# Patient Record
Sex: FEMALE | Race: WHITE | NOT HISPANIC OR LATINO | Employment: FULL TIME | URBAN - NONMETROPOLITAN AREA
[De-identification: names, ages, dates, MRNs, and addresses within clinical notes are randomized per-mention and may not be internally consistent; named-entity substitution may affect disease eponyms.]

---

## 2017-04-25 ENCOUNTER — CONSULT (OUTPATIENT)
Dept: CARDIOLOGY | Facility: CLINIC | Age: 47
End: 2017-04-25

## 2017-04-25 VITALS
DIASTOLIC BLOOD PRESSURE: 80 MMHG | SYSTOLIC BLOOD PRESSURE: 120 MMHG | BODY MASS INDEX: 25.31 KG/M2 | WEIGHT: 167 LBS | HEART RATE: 57 BPM | HEIGHT: 68 IN

## 2017-04-25 DIAGNOSIS — R00.2 PALPITATIONS: Primary | ICD-10-CM

## 2017-04-25 DIAGNOSIS — I10 ESSENTIAL HYPERTENSION: ICD-10-CM

## 2017-04-25 PROCEDURE — 93000 ELECTROCARDIOGRAM COMPLETE: CPT | Performed by: INTERNAL MEDICINE

## 2017-04-25 PROCEDURE — 99204 OFFICE O/P NEW MOD 45 MIN: CPT | Performed by: INTERNAL MEDICINE

## 2017-04-25 RX ORDER — BISOPROLOL FUMARATE 5 MG/1
5 TABLET, FILM COATED ORAL DAILY
COMMUNITY
End: 2017-04-25 | Stop reason: SDUPTHER

## 2017-04-25 RX ORDER — BISOPROLOL FUMARATE 5 MG/1
5 TABLET, FILM COATED ORAL DAILY
Qty: 90 TABLET | Refills: 3 | Status: SHIPPED | OUTPATIENT
Start: 2017-04-25 | End: 2017-09-26 | Stop reason: SDUPTHER

## 2017-04-25 RX ORDER — HYDRALAZINE HYDROCHLORIDE 25 MG/1
TABLET, FILM COATED ORAL
Qty: 60 TABLET | Refills: 3 | Status: SHIPPED | OUTPATIENT
Start: 2017-04-25 | End: 2017-09-26

## 2017-04-25 NOTE — PROGRESS NOTES
Encounter Date:04/25/2017    Location: [facility]    Patient ID: Ximena Sanchez is a 46 y.o.,  female    1970  Subjective:      Chief Complaint/Reason for visit:    Chief Complaint   Patient presents with   • Rapid Heart Rate       Problem List:  1.  Palpitations   A.  Stress test in [place] approx 7 years ago.  Irregular rhythm at 145 bpm; good recovery.    B.  Holter monitor in [place]:  Multifocal palpitations; data deficit  2.  Cardiac murmur   A.  Echocardiogram 2014: Dr Cartagena.  EF 56%, no significant valvular abnormalities.    3.  Hypertension   A.  Reaches stroke level with any source of infection  4.  Osteoarthritis in foot and ankle  5.  Uterine fibroids  6.  Surgeries   A.  Bilateral lumpectomy- benign    HPI:  Patient is a pleasant 46 year old female with the above noted medical history who presents today in consultation for palpitations.  She states that she was diagnosed with these many years ago while living in [place] and states that both her mother and grandmother have had failed ablations of premature ventricular contractions PVC's).  She had previously been on Propranolol with near syncope due to hypotension and dizziness.  She was then switched to Zebeta and feels well on this one.  She states that she typically notices her palpitations about once per month on average; however, she is starting to notice a slight increase of them occurring a couple of times each week.  She can have associated shortness of breath if they last up to 1 minute consistently.  Fortunately,  most of her episodes tend to be shorter in duration.  She admits that they are worse when she is overly tired, and around her menstrual cycle.  She has not been able to tolerate hormone therapy for her uterine fibroids because it causes her palpitations to become significantly worse.  She does limit her caffeine intake to 3-4 per week and does not note an increase in symptoms.  [place]  "and sometimes feels short of breath, but does not have palpitations.  She also states that with any source of infection, her blood pressure will elevate significantly with an associated headache.  She states it has been as high as 200/115 and was told by a clinician that she was \"acting and talking weird\" at that time.  For this reason, we will initiate a prn medication with parameters for her to take.      Cardiac ROS:  Positive for shortness of breath at times, fatigue, edema.  Negative for Chest pain, dyspnea on exertion, orthopnea, PND, fatigue, dizziness, pre-syncope/ syncope, snoring/ PAMELA    Cardiac Risk Factors: hypertension  Social History     Social History   • Marital status:      Spouse name: N/A   • Number of children: N/A   • Years of education: N/A     Occupational History   • Not on file.     Social History Main Topics   • Smoking status: Never Smoker   • Smokeless tobacco: Never Used   • Alcohol use Yes      Comment: socially- 1 drink per month   • Drug use: No   • Sexual activity: Defer     Other Topics Concern   • Not on file     Social History Narrative       family history includes Arrhythmia in her maternal grandmother and mother; Atrial fibrillation in her maternal grandmother and mother; No Known Problems in her father. There is no history of Ovarian cancer or Breast cancer.     has a past medical history of Essential hypertension (4/25/2017).    Allergies   Allergen Reactions   • Acetaminophen      Flu like symptoms     • Amoxicillin Hives   • Codeine GI Intolerance   • Prednisone      Low doses tolerated         Current Outpatient Prescriptions:   •  bisoprolol (ZEBeta) 5 MG tablet, Take 5 mg by mouth Daily., Disp: , Rfl:   •  hydrALAZINE (APRESOLINE) 25 MG tablet, Take 1 tablet by mouth BID as needed for a systolic blood pressure >150, Disp: 60 tablet, Rfl: 3    Review of Systems   Constitution: Negative.   HENT: Positive for headaches (when she is hypertensive).    Eyes: Negative.  "   Cardiovascular: Positive for irregular heartbeat and palpitations. Negative for chest pain, dyspnea on exertion, leg swelling, near-syncope, orthopnea, paroxysmal nocturnal dyspnea and syncope.   Respiratory: Positive for shortness of breath (with walking horse up and down hills).    Endocrine: Negative.    Hematologic/Lymphatic: Negative.    Skin: Negative.         Spider veins, varicosities BLE   Musculoskeletal: Negative.    Gastrointestinal: Negative.    Genitourinary: Negative.    Psychiatric/Behavioral: Negative.    Allergic/Immunologic: Negative.        Vitals:    04/25/17 0955   BP: 120/80   Pulse: 57          Objective:       Physical Exam   Constitutional: She is oriented to person, place, and time. She appears well-developed and well-nourished.   HENT:   Head: Normocephalic and atraumatic.   Eyes: Pupils are equal, round, and reactive to light.   Neck: Normal range of motion. Neck supple. No JVD present.   Cardiovascular: Normal rate, regular rhythm and intact distal pulses.  Exam reveals no gallop and no friction rub.    Murmur (1/6 CHADD) heard.  Pulmonary/Chest: Effort normal and breath sounds normal. She has no wheezes.   Abdominal: Soft. Bowel sounds are normal. There is no tenderness.   Musculoskeletal: Normal range of motion. She exhibits edema (trace on right LE).   Neurological: She is alert and oriented to person, place, and time.   Skin: Skin is warm and dry.   Psychiatric: She has a normal mood and affect. Her behavior is normal.       Data Review:     ECG 12 Lead  Date/Time: 4/25/2017 10:24 AM  Performed by: CONSTANTINO AYOUB  Authorized by: CONSTANTINO AYOUB   Rhythm: sinus bradycardia  Rate: bradycardic  BPM: 57  Comments: Sinus stefan with sinus arrythmia          Assessment:      ICD-10-CM ICD-9-CM   1. Palpitations R00.2 785.1   2. Essential hypertension I10 401.9       Plan:  1.  Start hydralazine 25mg BID prn for systolic >150 during times of infection  2.  Continue Zebeta as directed.   3.   Follow up in 3-4 months or sooner if needed.           Scribed for Tasneem Lawson MD by LUIS ANGEL Vega. 4/25/2017  11:02 AM   I Tasneem Lawson MD personally performed the services described in this documentation as scribed by the above individual in my presence, and it is both accurate and complete.    Tasneem Lawson MD, FACC

## 2017-09-26 ENCOUNTER — OFFICE VISIT (OUTPATIENT)
Dept: CARDIOLOGY | Facility: CLINIC | Age: 47
End: 2017-09-26

## 2017-09-26 VITALS
SYSTOLIC BLOOD PRESSURE: 151 MMHG | WEIGHT: 157 LBS | HEIGHT: 68 IN | BODY MASS INDEX: 23.79 KG/M2 | HEART RATE: 80 BPM | DIASTOLIC BLOOD PRESSURE: 87 MMHG

## 2017-09-26 DIAGNOSIS — R00.2 PALPITATIONS: Primary | ICD-10-CM

## 2017-09-26 DIAGNOSIS — I10 ESSENTIAL HYPERTENSION: ICD-10-CM

## 2017-09-26 PROBLEM — J20.9 ACUTE BRONCHITIS: Status: ACTIVE | Noted: 2017-09-26

## 2017-09-26 PROCEDURE — 99213 OFFICE O/P EST LOW 20 MIN: CPT | Performed by: INTERNAL MEDICINE

## 2017-09-26 RX ORDER — PREDNISONE 20 MG/1
TABLET ORAL DAILY
Refills: 0 | COMMUNITY
Start: 2017-09-21 | End: 2018-11-27

## 2017-09-26 RX ORDER — BENZONATATE 100 MG/1
CAPSULE ORAL 3 TIMES DAILY
Refills: 0 | COMMUNITY
Start: 2017-09-21 | End: 2018-11-27

## 2017-09-26 RX ORDER — CLONIDINE HYDROCHLORIDE 0.1 MG/1
0.1 TABLET ORAL EVERY 12 HOURS PRN
Qty: 30 TABLET | Refills: 3 | Status: SHIPPED | OUTPATIENT
Start: 2017-09-26

## 2017-09-26 RX ORDER — AZITHROMYCIN 250 MG/1
TABLET, FILM COATED ORAL
Qty: 6 TABLET | Refills: 0 | Status: SHIPPED | OUTPATIENT
Start: 2017-09-26 | End: 2018-11-27

## 2017-09-26 RX ORDER — BISOPROLOL FUMARATE 5 MG/1
5 TABLET, FILM COATED ORAL DAILY
Qty: 90 TABLET | Refills: 3 | Status: SHIPPED | OUTPATIENT
Start: 2017-09-26 | End: 2018-10-06 | Stop reason: SDUPTHER

## 2017-09-26 RX ORDER — CETIRIZINE HYDROCHLORIDE 10 MG/1
10 TABLET ORAL AS NEEDED
Refills: 0 | COMMUNITY
Start: 2017-09-18 | End: 2020-11-24

## 2017-09-26 NOTE — PROGRESS NOTES
[facility]  Office visit  Ximena Sanchez  1970  [phone]    VISIT DATE:  09/26/2017    PCP: Nataliya Barrera MD  [address] [place] [address]    CC:  Chief Complaint   Patient presents with   • palps   • Heart Murmur   • Shortness of Breath       PROBLEM LIST:  1.  Palpitations                        A.  Stress test in [place] approx 7 years ago.  Irregular rhythm at 145 bpm; good recovery.                         B.  Holter monitor in [place]:  Multifocal palpitations; data deficit  2.  Cardiac murmur                        A.  Echocardiogram 2014: Dr Cartagena.  EF 56%, no significant valvular abnormalities.    3.  Hypertension                        A.  Reaches stroke level with any source of infection  4.  Osteoarthritis in foot and ankle  5.  Uterine fibroids  6.  Surgeries                        A.  Bilateral lumpectomy- benign    ASSESSMENT:   Diagnosis Plan   1. Palpitations     2. Essential hypertension         PLAN:  -Continue bisoprolol  -Continue as needed albuterol inhaler and Tessalon Perles for acute bronchitis.  Has completed a course of steroid burst.  Starting Z-Mike, instructed to follow-up with primary care physician if symptoms persist    Subjective  Palpitations of remained relatively stable.  She has intermittent flares.  Blood pressures usually remain less than 140/90 mmHg unless she has an illness in which she can have spikes in her blood pressure greater than 170/100.  She developed headache and flushing palpitations when she used when necessary hydralazine.  Currently has had productive cough for about 2 weeks.  Has been on albuterol inhalers which helped for about an hour.  The only relief she can really get his with Robitussin but this makes her drowsy.  Tessalon Perles have not been very effective.  She underwent a steroid burst last week which was prescribed by urgent care clinic.    PHYSICAL EXAMINATION:  Vitals:    09/26/17 1546   BP: 151/87   BP Location:  "Right arm   Patient Position: Sitting   Pulse: 80   Weight: 157 lb (71.2 kg)   Height: 68\" (172.7 cm)     General Appearance:    Alert, cooperative, no distress, appears stated age   Head:    Normocephalic, without obvious abnormality, atraumatic   Eyes:    conjunctiva/corneas clear   Nose:   Nares normal, septum midline, mucosa normal, no drainage   Throat:   Lips, teeth and gums normal   Neck:   Supple, symmetrical, trachea midline, no carotid    bruit or JVD   Lungs:     Clear to auscultation bilaterally, respirations unlabored   Chest Wall:    No tenderness or deformity    Heart:    Regular rate and rhythm, S1 and S2 normal, no murmur, rub   or gallop, normal carotid impulse bilaterally without bruit.   Abdomen:     Soft, non-tender   Extremities:   Extremities normal, atraumatic, no cyanosis or edema   Pulses:   2+ and symmetric all extremities   Skin:   Skin color, texture, turgor normal, no rashes or lesions       Diagnostic Data:  Procedures  No results found for: CHLPL, TRIG, HDL, LDLDIRECT  Lab Results   Component Value Date    GLUCOSE 88 08/01/2015    BUN 11 08/01/2015    CREATININE 0.8 08/01/2015     08/01/2015    K 3.5 08/01/2015     08/01/2015    CO2 21 08/01/2015     No results found for: HGBA1C  Lab Results   Component Value Date    WBC 3.78 08/01/2015    HGB 14.2 08/01/2015    HCT 41.5 08/01/2015     08/01/2015       Allergies  Allergies   Allergen Reactions   • Acetaminophen      Flu like symptoms     • Amoxicillin Hives   • Codeine GI Intolerance   • Prednisone      Low doses tolerated       Current Medications    Current Outpatient Prescriptions:   •  benzonatate (TESSALON) 100 MG capsule, 3 (Three) Times a Day., Disp: , Rfl: 0  •  bisoprolol (ZEBeta) 5 MG tablet, Take 1 tablet by mouth Daily., Disp: 90 tablet, Rfl: 3  •  cetirizine (zyrTEC) 10 MG tablet, Daily., Disp: , Rfl: 0  •  predniSONE (DELTASONE) 20 MG tablet, Daily., Disp: , Rfl: 0  •  PROAIR  (90 Base) MCG/ACT " inhaler, inhale 2 puffs by mouth every 4 hours if needed, Disp: , Rfl: 0  •  azithromycin (ZITHROMAX Z-WILBER) 250 MG tablet, Take 2 tablets the first day, then 1 tablet daily for 4 days., Disp: 6 tablet, Rfl: 0  •  CloNIDine (CATAPRES) 0.1 MG tablet, Take 1 tablet by mouth Every 12 (Twelve) Hours As Needed for High Blood Pressure (>170/100)., Disp: 30 tablet, Rfl: 3          ROS  Review of Systems   Cardiovascular: Positive for palpitations. Negative for chest pain and dyspnea on exertion.   Respiratory: Positive for cough and wheezing. Negative for shortness of breath.          SOCIAL HX  Social History     Social History   • Marital status:      Spouse name: N/A   • Number of children: N/A   • Years of education: N/A     Occupational History   • Not on file.     Social History Main Topics   • Smoking status: Never Smoker   • Smokeless tobacco: Never Used   • Alcohol use Yes      Comment: socially- 1 drink per month   • Drug use: No   • Sexual activity: Defer     Other Topics Concern   • Not on file     Social History Narrative       FAMILY HX  Family History   Problem Relation Age of Onset   • Arrhythmia Mother    • Atrial fibrillation Mother    • No Known Problems Father    • Arrhythmia Maternal Grandmother    • Atrial fibrillation Maternal Grandmother    • Ovarian cancer Neg Hx    • Breast cancer Neg Hx              Andre Smyth III, MD, [facility]

## 2017-12-04 ENCOUNTER — TELEPHONE (OUTPATIENT)
Dept: CARDIOLOGY | Facility: CLINIC | Age: 47
End: 2017-12-04

## 2017-12-04 NOTE — TELEPHONE ENCOUNTER
Patient called and stated having gynecological surgery by  and needs cardiac clearance. Please advise.

## 2017-12-05 NOTE — TELEPHONE ENCOUNTER
Called patient and told her cleared for surgery. Faxed Cardiac clearance letter to  at [facility]. # [phone] per patients request.

## 2017-12-26 ENCOUNTER — APPOINTMENT (OUTPATIENT)
Dept: PREADMISSION TESTING | Facility: HOSPITAL | Age: 47
End: 2017-12-26

## 2017-12-26 ENCOUNTER — PREP FOR SURGERY (OUTPATIENT)
Dept: OTHER | Facility: HOSPITAL | Age: 47
End: 2017-12-26

## 2017-12-26 DIAGNOSIS — N93.9 ABNORMAL UTERINE BLEEDING (AUB): ICD-10-CM

## 2017-12-26 DIAGNOSIS — N93.9 ABNORMAL UTERINE BLEEDING (AUB): Primary | ICD-10-CM

## 2017-12-26 LAB
ALBUMIN SERPL-MCNC: 4.2 G/DL (ref 3.2–4.8)
ALBUMIN/GLOB SERPL: 1.8 G/DL (ref 1.5–2.5)
ALP SERPL-CCNC: 70 U/L (ref 25–100)
ALT SERPL W P-5'-P-CCNC: 17 U/L (ref 7–40)
ANION GAP SERPL CALCULATED.3IONS-SCNC: 18 MMOL/L (ref 3–11)
AST SERPL-CCNC: 21 U/L (ref 0–33)
BASOPHILS # BLD AUTO: 0.02 10*3/MM3 (ref 0–0.2)
BASOPHILS NFR BLD AUTO: 0.4 % (ref 0–1)
BILIRUB SERPL-MCNC: 0.5 MG/DL (ref 0.3–1.2)
BUN BLD-MCNC: 14 MG/DL (ref 9–23)
BUN/CREAT SERPL: 23.3 (ref 7–25)
CALCIUM SPEC-SCNC: 9.4 MG/DL (ref 8.7–10.4)
CHLORIDE SERPL-SCNC: 105 MMOL/L (ref 99–109)
CO2 SERPL-SCNC: 26 MMOL/L (ref 20–31)
CREAT BLD-MCNC: 0.6 MG/DL (ref 0.6–1.3)
DEPRECATED RDW RBC AUTO: 41.1 FL (ref 37–54)
EOSINOPHIL # BLD AUTO: 0.09 10*3/MM3 (ref 0–0.3)
EOSINOPHIL NFR BLD AUTO: 1.7 % (ref 0–3)
ERYTHROCYTE [DISTWIDTH] IN BLOOD BY AUTOMATED COUNT: 12.9 % (ref 11.3–14.5)
GFR SERPL CREATININE-BSD FRML MDRD: 106 ML/MIN/1.73
GLOBULIN UR ELPH-MCNC: 2.4 GM/DL
GLUCOSE BLD-MCNC: 89 MG/DL (ref 70–100)
HCG INTACT+B SERPL-ACNC: <5 MIU/ML
HCT VFR BLD AUTO: 40.4 % (ref 34.5–44)
HGB BLD-MCNC: 13.2 G/DL (ref 11.5–15.5)
IMM GRANULOCYTES # BLD: 0.02 10*3/MM3 (ref 0–0.03)
IMM GRANULOCYTES NFR BLD: 0.4 % (ref 0–0.6)
LYMPHOCYTES # BLD AUTO: 1.69 10*3/MM3 (ref 0.6–4.8)
LYMPHOCYTES NFR BLD AUTO: 32.3 % (ref 24–44)
MCH RBC QN AUTO: 28.4 PG (ref 27–31)
MCHC RBC AUTO-ENTMCNC: 32.7 G/DL (ref 32–36)
MCV RBC AUTO: 87.1 FL (ref 80–99)
MONOCYTES # BLD AUTO: 0.42 10*3/MM3 (ref 0–1)
MONOCYTES NFR BLD AUTO: 8 % (ref 0–12)
NEUTROPHILS # BLD AUTO: 2.99 10*3/MM3 (ref 1.5–8.3)
NEUTROPHILS NFR BLD AUTO: 57.2 % (ref 41–71)
PLATELET # BLD AUTO: 249 10*3/MM3 (ref 150–450)
PMV BLD AUTO: 10.4 FL (ref 6–12)
POTASSIUM BLD-SCNC: 4 MMOL/L (ref 3.5–5.5)
PROT SERPL-MCNC: 6.6 G/DL (ref 5.7–8.2)
RBC # BLD AUTO: 4.64 10*6/MM3 (ref 3.89–5.14)
SODIUM BLD-SCNC: 149 MMOL/L (ref 132–146)
WBC NRBC COR # BLD: 5.23 10*3/MM3 (ref 3.5–10.8)

## 2017-12-26 PROCEDURE — 93005 ELECTROCARDIOGRAM TRACING: CPT

## 2017-12-26 PROCEDURE — 84702 CHORIONIC GONADOTROPIN TEST: CPT | Performed by: OBSTETRICS & GYNECOLOGY

## 2017-12-26 PROCEDURE — 85025 COMPLETE CBC W/AUTO DIFF WBC: CPT | Performed by: OBSTETRICS & GYNECOLOGY

## 2017-12-26 PROCEDURE — 93010 ELECTROCARDIOGRAM REPORT: CPT | Performed by: INTERNAL MEDICINE

## 2017-12-26 PROCEDURE — 80053 COMPREHEN METABOLIC PANEL: CPT | Performed by: OBSTETRICS & GYNECOLOGY

## 2017-12-26 PROCEDURE — 36415 COLL VENOUS BLD VENIPUNCTURE: CPT

## 2017-12-26 RX ORDER — SODIUM CHLORIDE, SODIUM LACTATE, POTASSIUM CHLORIDE, CALCIUM CHLORIDE 600; 310; 30; 20 MG/100ML; MG/100ML; MG/100ML; MG/100ML
100 INJECTION, SOLUTION INTRAVENOUS CONTINUOUS
Status: CANCELLED | OUTPATIENT
Start: 2017-12-26

## 2017-12-26 RX ORDER — SODIUM CHLORIDE 0.9 % (FLUSH) 0.9 %
1-10 SYRINGE (ML) INJECTION AS NEEDED
Status: CANCELLED | OUTPATIENT
Start: 2017-12-26

## 2017-12-27 ENCOUNTER — LAB REQUISITION (OUTPATIENT)
Dept: LAB | Facility: HOSPITAL | Age: 47
End: 2017-12-27

## 2017-12-27 DIAGNOSIS — N92.0 EXCESSIVE AND FREQUENT MENSTRUATION WITH REGULAR CYCLE: ICD-10-CM

## 2017-12-27 PROCEDURE — 88305 TISSUE EXAM BY PATHOLOGIST: CPT | Performed by: OBSTETRICS & GYNECOLOGY

## 2017-12-28 LAB
CYTO UR: NORMAL
LAB AP CASE REPORT: NORMAL
LAB AP CLINICAL INFORMATION: NORMAL
Lab: NORMAL
PATH REPORT.FINAL DX SPEC: NORMAL
PATH REPORT.GROSS SPEC: NORMAL

## 2018-09-25 ENCOUNTER — TRANSCRIBE ORDERS (OUTPATIENT)
Dept: ADMINISTRATIVE | Facility: HOSPITAL | Age: 48
End: 2018-09-25

## 2018-09-25 ENCOUNTER — HOSPITAL ENCOUNTER (OUTPATIENT)
Dept: GENERAL RADIOLOGY | Facility: HOSPITAL | Age: 48
Discharge: HOME | End: 2018-09-25
Admitting: NURSE PRACTITIONER

## 2018-09-25 DIAGNOSIS — M79.671 RIGHT FOOT PAIN: ICD-10-CM

## 2018-09-25 DIAGNOSIS — M79.671 RIGHT FOOT PAIN: Primary | ICD-10-CM

## 2018-09-25 PROCEDURE — 73630 X-RAY EXAM OF FOOT: CPT

## 2018-10-08 RX ORDER — BISOPROLOL FUMARATE 5 MG/1
TABLET, FILM COATED ORAL
Qty: 90 TABLET | Refills: 0 | Status: SHIPPED | OUTPATIENT
Start: 2018-10-08 | End: 2018-11-27 | Stop reason: SDUPTHER

## 2018-11-27 ENCOUNTER — OFFICE VISIT (OUTPATIENT)
Dept: CARDIOLOGY | Facility: CLINIC | Age: 48
End: 2018-11-27

## 2018-11-27 VITALS
BODY MASS INDEX: 24.86 KG/M2 | SYSTOLIC BLOOD PRESSURE: 110 MMHG | WEIGHT: 164 LBS | DIASTOLIC BLOOD PRESSURE: 72 MMHG | HEIGHT: 68 IN | HEART RATE: 76 BPM

## 2018-11-27 DIAGNOSIS — R00.2 PALPITATIONS: Primary | ICD-10-CM

## 2018-11-27 PROBLEM — I10 ESSENTIAL HYPERTENSION: Status: RESOLVED | Noted: 2017-04-25 | Resolved: 2018-11-27

## 2018-11-27 PROCEDURE — 99213 OFFICE O/P EST LOW 20 MIN: CPT | Performed by: INTERNAL MEDICINE

## 2018-11-27 RX ORDER — BISOPROLOL FUMARATE 5 MG/1
5 TABLET, FILM COATED ORAL DAILY
Qty: 90 TABLET | Refills: 4 | Status: SHIPPED | OUTPATIENT
Start: 2018-11-27 | End: 2019-12-10 | Stop reason: SDUPTHER

## 2018-11-27 NOTE — PROGRESS NOTES
[facility] at [facility]  Office visit  Ximena Sanchez  1970  [phone]    VISIT DATE:  09/26/2017    PCP: Awilda Frances MD  [address] [address]    CC:  No chief complaint on file.      PROBLEM LIST:  1.  Palpitations                        A.  Stress test in [place] approx 7 years ago.  Irregular rhythm at 145 bpm; good recovery.                         B.  Holter monitor in [place]:  Multifocal palpitations; data deficit  2.  Cardiac murmur                        A.  Echocardiogram 2014: EF 56%, no significant valvular abnormalities.    3.  Hypertension  4.  Osteoarthritis in foot and ankle  5.  Uterine fibroids  6.  Surgeries                        A.  Bilateral lumpectomy- benign    ASSESSMENT:   Diagnosis Plan   1. Palpitations         PLAN:  -Continue bisoprolol  -Encouraged regular exercise  -Avoiding dehydration and liberalizing salt intake during flares of orthostasis  -Ambulatory ECG monitor(Zio) after the first of the year if symptoms of palpitations persist or worsen.    Subjective  Developed lightheadedness after being seated for prolonged periods of time towards the end of the summer.  Predominantly noticed this when she was trying to get out of her car.  No near syncope or syncope.  There is no obvious trigger to her orthostasis.  She did not change the way her medications were taken and she had no change in her baseline functional capacity or weight.  Symptoms gradually resolved.  Over the previous few weeks she's had an increase in her palpitations which she describes as a brief fluttering sensation.  They're not associated with presyncope, chest pain or shortness of breath.  No obvious triggers.  Occurring 2-3 times per week.  Also has intermittent flip-flop sensations in her chest when she is trying to go to sleep at night.  Still taking bisoprolol bedtime..  Discussed potential ambulatory ECG monitor, she is concerned that her insurance will not cover  this, she currently has no high risk clinical features.    PHYSICAL EXAMINATION:  There were no vitals filed for this visit.  General Appearance:    Alert, cooperative, no distress, appears stated age   Head:    Normocephalic, without obvious abnormality, atraumatic   Eyes:    conjunctiva/corneas clear   Nose:   Nares normal, septum midline, mucosa normal, no drainage   Throat:   Lips, teeth and gums normal   Neck:   Supple, symmetrical, trachea midline, no carotid    bruit or JVD   Lungs:     Clear to auscultation bilaterally, respirations unlabored   Chest Wall:    No tenderness or deformity    Heart:    Regular rate and rhythm, S1 and S2 normal, no murmur, rub   or gallop, normal carotid impulse bilaterally without bruit.   Abdomen:     Soft, non-tender   Extremities:   Extremities normal, atraumatic, no cyanosis or edema   Pulses:   2+ and symmetric all extremities   Skin:   Skin color, texture, turgor normal, no rashes or lesions       Diagnostic Data:  Procedures  No results found for: CHLPL, TRIG, HDL, LDLDIRECT  Lab Results   Component Value Date    GLUCOSE 89 12/26/2017    BUN 14 12/26/2017    CREATININE 0.60 12/26/2017     (H) 12/26/2017    K 4.0 12/26/2017     12/26/2017    CO2 26.0 12/26/2017     No results found for: HGBA1C  Lab Results   Component Value Date    WBC 5.23 12/26/2017    HGB 13.2 12/26/2017    HCT 40.4 12/26/2017     12/26/2017       Allergies  Allergies   Allergen Reactions   • Acetaminophen      Flu like symptoms     • Amoxicillin Hives   • Codeine GI Intolerance   • Prednisone      Low doses tolerated       Current Medications    Current Outpatient Medications:   •  azithromycin (ZITHROMAX Z-WILBER) 250 MG tablet, Take 2 tablets the first day, then 1 tablet daily for 4 days., Disp: 6 tablet, Rfl: 0  •  benzonatate (TESSALON) 100 MG capsule, 3 (Three) Times a Day., Disp: , Rfl: 0  •  bisoprolol (ZEBeta) 5 MG tablet, take 1 tablet by mouth once daily, Disp: 90 tablet, Rfl:  0  •  cetirizine (zyrTEC) 10 MG tablet, Daily., Disp: , Rfl: 0  •  CloNIDine (CATAPRES) 0.1 MG tablet, Take 1 tablet by mouth Every 12 (Twelve) Hours As Needed for High Blood Pressure (>170/100)., Disp: 30 tablet, Rfl: 3  •  predniSONE (DELTASONE) 20 MG tablet, Daily., Disp: , Rfl: 0  •  PROAIR  (90 Base) MCG/ACT inhaler, inhale 2 puffs by mouth every 4 hours if needed, Disp: , Rfl: 0          ROS  Review of Systems   Cardiovascular: Positive for palpitations. Negative for chest pain and dyspnea on exertion.   Respiratory: Positive for cough and wheezing. Negative for shortness of breath.          SOCIAL HX  Social History     Socioeconomic History   • Marital status:      Spouse name: Not on file   • Number of children: Not on file   • Years of education: Not on file   • Highest education level: Not on file   Social Needs   • Financial resource strain: Not on file   • Food insecurity - worry: Not on file   • Food insecurity - inability: Not on file   • Transportation needs - medical: Not on file   • Transportation needs - non-medical: Not on file   Occupational History   • Not on file   Tobacco Use   • Smoking status: Never Smoker   • Smokeless tobacco: Never Used   Substance and Sexual Activity   • Alcohol use: Yes     Comment: socially- 1 drink per month   • Drug use: No   • Sexual activity: Defer   Other Topics Concern   • Not on file   Social History Narrative   • Not on file       FAMILY HX  Family History   Problem Relation Age of Onset   • Arrhythmia Mother    • Atrial fibrillation Mother    • No Known Problems Father    • Arrhythmia Maternal Grandmother    • Atrial fibrillation Maternal Grandmother    • Ovarian cancer Neg Hx    • Breast cancer Neg Hx              Andre Smyth III, MD, [facility]

## 2019-01-28 ENCOUNTER — TRANSCRIBE ORDERS (OUTPATIENT)
Dept: ADMINISTRATIVE | Facility: HOSPITAL | Age: 49
End: 2019-01-28

## 2019-01-28 ENCOUNTER — HOSPITAL ENCOUNTER (OUTPATIENT)
Dept: CT IMAGING | Facility: HOSPITAL | Age: 49
Discharge: HOME | End: 2019-01-28
Attending: FAMILY MEDICINE | Admitting: FAMILY MEDICINE

## 2019-01-28 DIAGNOSIS — R10.31 RLQ ABDOMINAL PAIN: Primary | ICD-10-CM

## 2019-01-28 DIAGNOSIS — R10.31 RLQ ABDOMINAL PAIN: ICD-10-CM

## 2019-01-28 PROCEDURE — 74176 CT ABD & PELVIS W/O CONTRAST: CPT

## 2019-05-29 ENCOUNTER — TELEPHONE (OUTPATIENT)
Dept: CARDIOLOGY | Facility: CLINIC | Age: 49
End: 2019-05-29

## 2019-05-29 NOTE — TELEPHONE ENCOUNTER
Patient notified of Drinking green tea is fine and would avoid tea extracts in a pill form. She verbalized understanding.

## 2019-05-29 NOTE — TELEPHONE ENCOUNTER
Exercising 30 minutes every day and has increased her salt intake.Still having a lot of fatigue. She wants to start taking herbal supplements -green tea & wants to know if  there are any supplements that she needs to avoid? Please advise.

## 2019-06-11 ENCOUNTER — TRANSCRIBE ORDERS (OUTPATIENT)
Dept: ADMINISTRATIVE | Facility: HOSPITAL | Age: 49
End: 2019-06-11

## 2019-06-11 DIAGNOSIS — Z12.31 VISIT FOR SCREENING MAMMOGRAM: Primary | ICD-10-CM

## 2019-06-14 ENCOUNTER — APPOINTMENT (OUTPATIENT)
Dept: MAMMOGRAPHY | Facility: HOSPITAL | Age: 49
End: 2019-06-14

## 2019-07-24 ENCOUNTER — HOSPITAL ENCOUNTER (OUTPATIENT)
Dept: MAMMOGRAPHY | Facility: HOSPITAL | Age: 49
Discharge: HOME | End: 2019-07-24
Admitting: OBSTETRICS & GYNECOLOGY

## 2019-07-24 DIAGNOSIS — Z12.31 VISIT FOR SCREENING MAMMOGRAM: ICD-10-CM

## 2019-07-24 PROCEDURE — 77063 BREAST TOMOSYNTHESIS BI: CPT | Performed by: RADIOLOGY

## 2019-07-24 PROCEDURE — 77067 SCR MAMMO BI INCL CAD: CPT

## 2019-07-24 PROCEDURE — 77067 SCR MAMMO BI INCL CAD: CPT | Performed by: RADIOLOGY

## 2019-07-24 PROCEDURE — 77063 BREAST TOMOSYNTHESIS BI: CPT

## 2019-11-20 ENCOUNTER — TELEPHONE (OUTPATIENT)
Dept: CARDIOLOGY | Facility: CLINIC | Age: 49
End: 2019-11-20

## 2019-11-20 NOTE — TELEPHONE ENCOUNTER
Went to PCP today and was told to tell her Cardiologist that 2 weeks ago she passed out at work. She had 2 vaccines at work that day(tetanus and Hep A). About 30 minutes later after walking up a hill to her office she felt woozy and fainted. EMS came but she was not transported anywhere. Also states has been sick with bronchitis. No other symptoms reported.  Do you want any testing ordered? Please advise.

## 2019-12-10 RX ORDER — BISOPROLOL FUMARATE 5 MG/1
TABLET, FILM COATED ORAL
Qty: 90 TABLET | Refills: 1 | Status: SHIPPED | OUTPATIENT
Start: 2019-12-10 | End: 2020-05-12 | Stop reason: SDUPTHER

## 2020-02-11 ENCOUNTER — OFFICE VISIT (OUTPATIENT)
Dept: CARDIOLOGY | Facility: CLINIC | Age: 50
End: 2020-02-11

## 2020-02-11 VITALS
DIASTOLIC BLOOD PRESSURE: 74 MMHG | WEIGHT: 166 LBS | HEART RATE: 79 BPM | BODY MASS INDEX: 25.16 KG/M2 | HEIGHT: 68 IN | OXYGEN SATURATION: 93 % | SYSTOLIC BLOOD PRESSURE: 120 MMHG

## 2020-02-11 DIAGNOSIS — R00.2 PALPITATIONS: ICD-10-CM

## 2020-02-11 DIAGNOSIS — R55 SYNCOPE AND COLLAPSE: Primary | ICD-10-CM

## 2020-02-11 PROCEDURE — 99213 OFFICE O/P EST LOW 20 MIN: CPT | Performed by: INTERNAL MEDICINE

## 2020-02-11 NOTE — PROGRESS NOTES
[facility] at [facility]  Office visit  Ximena Sanchez  1970  [phone]    VISIT DATE:  2/11/2020      PCP: Awilda Frances MD  [address] [address]    CC:  Chief Complaint   Patient presents with   • Palpitations       PROBLEM LIST:  1.  Palpitations                        A.  Stress test in [place] approx 7 years ago.  Irregular rhythm at 145 bpm; good recovery.                         B.  Holter monitor in [place]:  Multifocal palpitations; data deficit  2.  Cardiac murmur                        A.  Echocardiogram 2014: EF 56%, no significant valvular abnormalities.    3.  Hypertension  4.  Osteoarthritis in foot and ankle  5.  Uterine fibroids  6.  Surgeries                        A.  Bilateral lumpectomy- benign    ASSESSMENT:   Diagnosis Plan   1. Syncope and collapse  Adult Transthoracic Echo Complete W/ Cont if Necessary Per Protocol   2. Palpitations         PLAN:  Palpitations: Continue bisoprolol and regular exercise.    Syncope: Suspect vasovagal etiology.  Currently asymptomatic.  Transthoracic echo pending to evaluate underlying myocardial structure and function.  Will arrange for ambulatory ECG monitor if she has recurrent episodes of lightheadedness or increase in palpitations.    Subjective  Palpitations are being well controlled as long she exercises on a daily basis.  She is compliant with medical therapy.  She had an episode of syncope in October of last year after getting an influenza vaccine and tetanus booster.  She was sitting at her desk eating and drinking breakfast when she developed gradual progressive lightheadedness with associated fatigue, this progressed to near syncope, she was alone at work and tried to walk to some place so that she can get help, eventually had loss of consciousness in the parking lot and was found by her colleagues.  Underwent an unremarkable evaluation at [facility].  Williams fatigued for the remainder of  "the day.  No associated palpitations or chest discomfort.  She has had no further episodes since that time.    PHYSICAL EXAMINATION:  Vitals:    02/11/20 1449   BP: 120/74   BP Location: Left arm   Patient Position: Sitting   Pulse: 79   SpO2: 93%   Weight: 75.3 kg (166 lb)   Height: 172.7 cm (68\")     General Appearance:    Alert, cooperative, no distress, appears stated age   Head:    Normocephalic, without obvious abnormality, atraumatic   Eyes:    conjunctiva/corneas clear   Nose:   Nares normal, septum midline, mucosa normal, no drainage   Throat:   Lips, teeth and gums normal   Neck:   Supple, symmetrical, trachea midline, no carotid    bruit or JVD   Lungs:     Clear to auscultation bilaterally, respirations unlabored   Chest Wall:    No tenderness or deformity    Heart:    Regular rate and rhythm, S1 and S2 normal, no murmur, rub   or gallop, normal carotid impulse bilaterally without bruit.   Abdomen:     Soft, non-tender   Extremities:   Extremities normal, atraumatic, no cyanosis or edema   Pulses:   2+ and symmetric all extremities   Skin:   Skin color, texture, turgor normal, no rashes or lesions       Diagnostic Data:  Procedures  No results found for: CHLPL, TRIG, HDL, LDLDIRECT  Lab Results   Component Value Date    GLUCOSE 89 12/26/2017    BUN 14 12/26/2017    CREATININE 0.60 12/26/2017     (H) 12/26/2017    K 4.0 12/26/2017     12/26/2017    CO2 26.0 12/26/2017     No results found for: HGBA1C  Lab Results   Component Value Date    WBC 5.23 12/26/2017    HGB 13.2 12/26/2017    HCT 40.4 12/26/2017     12/26/2017       Allergies  Allergies   Allergen Reactions   • Acetaminophen Other (See Comments)     Flu like symptoms     • Amoxicillin Hives   • Codeine GI Intolerance   • Prednisone Other (See Comments)     Low doses tolerated       Current Medications    Current Outpatient Medications:   •  bisoprolol (ZEBeta) 5 MG tablet, take 1 tablet by mouth once daily, Disp: 90 tablet, Rfl: " 1  •  cetirizine (zyrTEC) 10 MG tablet, Take 10 mg by mouth As Needed., Disp: , Rfl: 0  •  CloNIDine (CATAPRES) 0.1 MG tablet, Take 1 tablet by mouth Every 12 (Twelve) Hours As Needed for High Blood Pressure (>170/100)., Disp: 30 tablet, Rfl: 3  •  PROAIR  (90 Base) MCG/ACT inhaler, inhale 2 puffs by mouth every 4 hours if needed, Disp: , Rfl: 0          ROS  Review of Systems   Cardiovascular: Positive for palpitations. Negative for chest pain and dyspnea on exertion.   Respiratory: Positive for cough and wheezing. Negative for shortness of breath.          SOCIAL HX  Social History     Socioeconomic History   • Marital status:      Spouse name: Not on file   • Number of children: Not on file   • Years of education: Not on file   • Highest education level: Not on file   Tobacco Use   • Smoking status: Never Smoker   • Smokeless tobacco: Never Used   Substance and Sexual Activity   • Alcohol use: Yes     Comment: socially- 1 drink per month   • Drug use: No   • Sexual activity: Defer       FAMILY HX  Family History   Problem Relation Age of Onset   • Arrhythmia Mother    • Atrial fibrillation Mother    • No Known Problems Father    • Arrhythmia Maternal Grandmother    • Atrial fibrillation Maternal Grandmother    • Ovarian cancer Neg Hx    • Breast cancer Neg Hx              Andre Smyth III, MD, [facility]

## 2020-02-18 ENCOUNTER — HOSPITAL ENCOUNTER (OUTPATIENT)
Dept: CARDIOLOGY | Facility: HOSPITAL | Age: 50
Discharge: HOME | End: 2020-02-18
Admitting: INTERNAL MEDICINE

## 2020-02-18 VITALS — BODY MASS INDEX: 25.16 KG/M2 | HEIGHT: 68 IN | WEIGHT: 166 LBS

## 2020-02-18 DIAGNOSIS — R55 SYNCOPE AND COLLAPSE: ICD-10-CM

## 2020-02-18 PROCEDURE — 93306 TTE W/DOPPLER COMPLETE: CPT

## 2020-02-18 PROCEDURE — 93306 TTE W/DOPPLER COMPLETE: CPT | Performed by: INTERNAL MEDICINE

## 2020-02-19 ENCOUNTER — TELEPHONE (OUTPATIENT)
Dept: CARDIOLOGY | Facility: CLINIC | Age: 50
End: 2020-02-19

## 2020-02-19 LAB
BH CV ECHO MEAS - AO MAX PG (FULL): 2.6 MMHG
BH CV ECHO MEAS - AO MAX PG: 8 MMHG
BH CV ECHO MEAS - AO MEAN PG (FULL): 1 MMHG
BH CV ECHO MEAS - AO MEAN PG: 4 MMHG
BH CV ECHO MEAS - AO V2 MAX: 141 CM/SEC
BH CV ECHO MEAS - AO V2 MEAN: 93.9 CM/SEC
BH CV ECHO MEAS - AO V2 VTI: 31.7 CM
BH CV ECHO MEAS - AVA(I,A): 2.4 CM^2
BH CV ECHO MEAS - AVA(I,D): 2.4 CM^2
BH CV ECHO MEAS - AVA(V,A): 2.6 CM^2
BH CV ECHO MEAS - AVA(V,D): 2.6 CM^2
BH CV ECHO MEAS - BSA(HAYCOCK): 1.9 M^2
BH CV ECHO MEAS - BSA: 1.9 M^2
BH CV ECHO MEAS - BZI_BMI: 25.2 KILOGRAMS/M^2
BH CV ECHO MEAS - BZI_METRIC_HEIGHT: 172.7 CM
BH CV ECHO MEAS - BZI_METRIC_WEIGHT: 75.3 KG
BH CV ECHO MEAS - EDV(CUBED): 86.4 ML
BH CV ECHO MEAS - EDV(MOD-SP2): 84 ML
BH CV ECHO MEAS - EDV(MOD-SP4): 84 ML
BH CV ECHO MEAS - EDV(TEICH): 88.6 ML
BH CV ECHO MEAS - EF(CUBED): 69.4 %
BH CV ECHO MEAS - EF(MOD-SP2): 70.2 %
BH CV ECHO MEAS - EF(MOD-SP4): 61.9 %
BH CV ECHO MEAS - EF(TEICH): 61.2 %
BH CV ECHO MEAS - ESV(CUBED): 26.5 ML
BH CV ECHO MEAS - ESV(MOD-SP2): 25 ML
BH CV ECHO MEAS - ESV(MOD-SP4): 32 ML
BH CV ECHO MEAS - ESV(TEICH): 34.4 ML
BH CV ECHO MEAS - FS: 32.6 %
BH CV ECHO MEAS - IVS/LVPW: 1.2
BH CV ECHO MEAS - IVSD: 0.94 CM
BH CV ECHO MEAS - LA DIMENSION: 3.4 CM
BH CV ECHO MEAS - LAD MAJOR: 4.3 CM
BH CV ECHO MEAS - LAT PEAK E' VEL: 9 CM/SEC
BH CV ECHO MEAS - LATERAL E/E' RATIO: 8.5
BH CV ECHO MEAS - LV DIASTOLIC VOL/BSA (35-75): 44.5 ML/M^2
BH CV ECHO MEAS - LV MASS(C)D: 124.1 GRAMS
BH CV ECHO MEAS - LV MASS(C)DI: 65.7 GRAMS/M^2
BH CV ECHO MEAS - LV MAX PG: 5.4 MMHG
BH CV ECHO MEAS - LV MEAN PG: 3 MMHG
BH CV ECHO MEAS - LV SYSTOLIC VOL/BSA (12-30): 16.9 ML/M^2
BH CV ECHO MEAS - LV V1 MAX: 116 CM/SEC
BH CV ECHO MEAS - LV V1 MEAN: 74.9 CM/SEC
BH CV ECHO MEAS - LV V1 VTI: 24.4 CM
BH CV ECHO MEAS - LVIDD: 4.4 CM
BH CV ECHO MEAS - LVIDS: 3 CM
BH CV ECHO MEAS - LVLD AP2: 6.9 CM
BH CV ECHO MEAS - LVLD AP4: 7.3 CM
BH CV ECHO MEAS - LVLS AP2: 5.3 CM
BH CV ECHO MEAS - LVLS AP4: 6.2 CM
BH CV ECHO MEAS - LVOT AREA (M): 3.1 CM^2
BH CV ECHO MEAS - LVOT AREA: 3.1 CM^2
BH CV ECHO MEAS - LVOT DIAM: 2 CM
BH CV ECHO MEAS - LVPWD: 0.81 CM
BH CV ECHO MEAS - MED PEAK E' VEL: 7.9 CM/SEC
BH CV ECHO MEAS - MEDIAL E/E' RATIO: 9.6
BH CV ECHO MEAS - MV A MAX VEL: 39.5 CM/SEC
BH CV ECHO MEAS - MV DEC TIME: 0.23 SEC
BH CV ECHO MEAS - MV E MAX VEL: 76 CM/SEC
BH CV ECHO MEAS - MV E/A: 1.9
BH CV ECHO MEAS - PA ACC SLOPE: 580 CM/SEC^2
BH CV ECHO MEAS - PA ACC TIME: 0.16 SEC
BH CV ECHO MEAS - PA MAX PG: 4.2 MMHG
BH CV ECHO MEAS - PA PR(ACCEL): 9.3 MMHG
BH CV ECHO MEAS - PA V2 MAX: 102 CM/SEC
BH CV ECHO MEAS - RAP SYSTOLE: 3 MMHG
BH CV ECHO MEAS - RVSP: 23 MMHG
BH CV ECHO MEAS - SI(CUBED): 31.7 ML/M^2
BH CV ECHO MEAS - SI(LVOT): 40.6 ML/M^2
BH CV ECHO MEAS - SI(MOD-SP2): 31.2 ML/M^2
BH CV ECHO MEAS - SI(MOD-SP4): 27.5 ML/M^2
BH CV ECHO MEAS - SI(TEICH): 28.7 ML/M^2
BH CV ECHO MEAS - SV(CUBED): 59.9 ML
BH CV ECHO MEAS - SV(LVOT): 76.7 ML
BH CV ECHO MEAS - SV(MOD-SP2): 59 ML
BH CV ECHO MEAS - SV(MOD-SP4): 52 ML
BH CV ECHO MEAS - SV(TEICH): 54.2 ML
BH CV ECHO MEAS - TAPSE (>1.6): 2.7 CM2
BH CV ECHO MEAS - TR MAX PG: 20 MMHG
BH CV ECHO MEAS - TR MAX VEL: 224.5 CM/SEC
BH CV ECHO MEASUREMENTS AVERAGE E/E' RATIO: 8.99
BH CV XLRA - RV BASE: 3.1 CM
BH CV XLRA - RV LENGTH: 5.6 CM
BH CV XLRA - RV MID: 2.6 CM
BH CV XLRA - TDI S': 15.9 CM/SEC
LEFT ATRIUM VOLUME INDEX: 18 ML/M^2
LEFT ATRIUM VOLUME: 34 ML

## 2020-05-12 RX ORDER — BISOPROLOL FUMARATE 5 MG/1
5 TABLET, FILM COATED ORAL DAILY
Qty: 90 TABLET | Refills: 1 | Status: SHIPPED | OUTPATIENT
Start: 2020-05-12 | End: 2020-12-08 | Stop reason: SDUPTHER

## 2020-08-11 ENCOUNTER — OFFICE VISIT (OUTPATIENT)
Dept: CARDIOLOGY | Facility: CLINIC | Age: 50
End: 2020-08-11

## 2020-08-11 VITALS
OXYGEN SATURATION: 99 % | DIASTOLIC BLOOD PRESSURE: 78 MMHG | HEART RATE: 74 BPM | SYSTOLIC BLOOD PRESSURE: 116 MMHG | WEIGHT: 162 LBS | BODY MASS INDEX: 24.55 KG/M2 | HEIGHT: 68 IN

## 2020-08-11 DIAGNOSIS — R00.2 PALPITATIONS: Primary | ICD-10-CM

## 2020-08-11 DIAGNOSIS — R53.83 FATIGUE, UNSPECIFIED TYPE: ICD-10-CM

## 2020-08-11 DIAGNOSIS — R55 SYNCOPE AND COLLAPSE: ICD-10-CM

## 2020-08-11 PROCEDURE — 99213 OFFICE O/P EST LOW 20 MIN: CPT | Performed by: INTERNAL MEDICINE

## 2020-08-11 RX ORDER — MODAFINIL 100 MG/1
100 TABLET ORAL DAILY
Qty: 30 TABLET | Refills: 5 | Status: SHIPPED | OUTPATIENT
Start: 2020-08-11 | End: 2020-08-11 | Stop reason: SDUPTHER

## 2020-08-11 NOTE — PROGRESS NOTES
[facility]  Office visit  Ximena Sanchez  1970  [phone]    VISIT DATE:  8/11/2020      PCP: Awilda Frances MD  [address] [address]    CC:  Chief Complaint   Patient presents with   • Loss of Consciousness       PROBLEM LIST:  1.  Palpitations                        A.  Stress test in [place] approx 7 years ago.  Irregular rhythm at 145 bpm; good recovery.                         B.  Holter monitor in [place]:  Multifocal palpitations; data deficit  2.  Cardiac murmur                        A.  Echocardiogram 2014: EF 56%, no significant valvular abnormalities.    3.  Hypertension  4.  Osteoarthritis in foot and ankle  5.  Uterine fibroids  6.  Surgeries                        A.  Bilateral lumpectomy- benign    Previous cardiac studies and procedures:  February 2020 echo  · Left ventricular systolic function is normal.  · Estimated EF appears to be in the range of 61 - 65%    ASSESSMENT:   Diagnosis Plan   1. Palpitations     2. Syncope and collapse         PLAN:  Palpitations: Continue bisoprolol, decreasing to 2.5 mg p.o. nightly in order to potentially limit daytime somnolence and fatigue.  Continue regular exercise which really appears to help moderate symptoms.    Syncope: Suspect vasovagal etiology.  Currently asymptomatic.  Transthoracic echo revealed normal myocardial structure and function.  Will arrange for ambulatory ECG monitor if she has recurrent episodes of lightheadedness or increase in palpitations.    Daytime fatigue and somnolence: No clinical concern for sleep disorder at this time.  Down titrating beta-blockade.  Potentially consistent with underlying mild narcolepsy.  Trial of modafinil 100 mg p.o. daily if symptoms persist after down titration of beta-blockade.    Subjective  Palpitations are being well controlled as long she exercises on a daily basis.  She is compliant with medical therapy.  She reports that her palpitations are  "well controlled as long as she exercises and takes her bisoprolol.  Ongoing evaluation and treatment for underlying gynecologic issues which is intermittently flared up her palpitations.  She is complaining of daytime somnolence, falling asleep easily in the seated position while sitting at work looking at the computer or talking in a group of people.    PHYSICAL EXAMINATION:  Vitals:    08/11/20 1441   BP: 116/78   BP Location: Left arm   Patient Position: Sitting   Pulse: 74   SpO2: 99%   Weight: 73.5 kg (162 lb)   Height: 172.7 cm (68\")     General Appearance:    Alert, cooperative, no distress, appears stated age   Head:    Normocephalic, without obvious abnormality, atraumatic   Eyes:    conjunctiva/corneas clear   Nose:   Nares normal, septum midline, mucosa normal, no drainage   Throat:   Lips, teeth and gums normal   Neck:   Supple, symmetrical, trachea midline, no carotid    bruit or JVD   Lungs:     Clear to auscultation bilaterally, respirations unlabored   Chest Wall:    No tenderness or deformity    Heart:    Regular rate and rhythm, S1 and S2 normal, no murmur, rub   or gallop, normal carotid impulse bilaterally without bruit.   Abdomen:     Soft, non-tender   Extremities:   Extremities normal, atraumatic, no cyanosis or edema   Pulses:   2+ and symmetric all extremities   Skin:   Skin color, texture, turgor normal, no rashes or lesions       Diagnostic Data:  Procedures  No results found for: CHLPL, TRIG, HDL, LDLDIRECT  Lab Results   Component Value Date    GLUCOSE 89 12/26/2017    BUN 14 12/26/2017    CREATININE 0.60 12/26/2017     (H) 12/26/2017    K 4.0 12/26/2017     12/26/2017    CO2 26.0 12/26/2017     No results found for: HGBA1C  Lab Results   Component Value Date    WBC 5.23 12/26/2017    HGB 13.2 12/26/2017    HCT 40.4 12/26/2017     12/26/2017       Allergies  Allergies   Allergen Reactions   • Acetaminophen Other (See Comments)     Flu like symptoms     • Amoxicillin " Hives   • Codeine GI Intolerance   • Prednisone Other (See Comments)     Low doses tolerated       Current Medications    Current Outpatient Medications:   •  bisoprolol (ZEBeta) 5 MG tablet, Take 1 tablet by mouth Daily., Disp: 90 tablet, Rfl: 1  •  cetirizine (zyrTEC) 10 MG tablet, Take 10 mg by mouth As Needed., Disp: , Rfl: 0  •  CloNIDine (CATAPRES) 0.1 MG tablet, Take 1 tablet by mouth Every 12 (Twelve) Hours As Needed for High Blood Pressure (>170/100)., Disp: 30 tablet, Rfl: 3  •  PROAIR  (90 Base) MCG/ACT inhaler, inhale 2 puffs by mouth every 4 hours if needed, Disp: , Rfl: 0          ROS  Review of Systems   Cardiovascular: Positive for palpitations. Negative for chest pain and dyspnea on exertion.   Respiratory: Positive for cough and wheezing. Negative for shortness of breath.          SOCIAL HX  Social History     Socioeconomic History   • Marital status:      Spouse name: Not on file   • Number of children: Not on file   • Years of education: Not on file   • Highest education level: Not on file   Tobacco Use   • Smoking status: Never Smoker   • Smokeless tobacco: Never Used   Substance and Sexual Activity   • Alcohol use: Yes     Comment: socially- 1 drink per month   • Drug use: No   • Sexual activity: Defer       FAMILY HX  Family History   Problem Relation Age of Onset   • Arrhythmia Mother    • Atrial fibrillation Mother    • No Known Problems Father    • Arrhythmia Maternal Grandmother    • Atrial fibrillation Maternal Grandmother    • Ovarian cancer Neg Hx    • Breast cancer Neg Hx              Andre Smyth III, MD, [facility]

## 2020-08-12 RX ORDER — MODAFINIL 100 MG/1
100 TABLET ORAL DAILY
Qty: 30 TABLET | Refills: 5 | Status: SHIPPED | OUTPATIENT
Start: 2020-08-12 | End: 2020-11-24

## 2020-08-13 ENCOUNTER — TELEPHONE (OUTPATIENT)
Dept: CARDIOLOGY | Facility: CLINIC | Age: 50
End: 2020-08-13

## 2020-11-24 ENCOUNTER — OFFICE VISIT (OUTPATIENT)
Dept: CARDIOLOGY | Facility: CLINIC | Age: 50
End: 2020-11-24

## 2020-11-24 VITALS
WEIGHT: 165 LBS | DIASTOLIC BLOOD PRESSURE: 70 MMHG | SYSTOLIC BLOOD PRESSURE: 104 MMHG | TEMPERATURE: 97.1 F | HEART RATE: 70 BPM | BODY MASS INDEX: 25.01 KG/M2 | HEIGHT: 68 IN

## 2020-11-24 DIAGNOSIS — R55 SYNCOPE AND COLLAPSE: Primary | ICD-10-CM

## 2020-11-24 DIAGNOSIS — R00.2 PALPITATIONS: ICD-10-CM

## 2020-11-24 PROCEDURE — 99213 OFFICE O/P EST LOW 20 MIN: CPT | Performed by: INTERNAL MEDICINE

## 2020-11-24 RX ORDER — MODAFINIL 100 MG/1
100 TABLET ORAL AS NEEDED
COMMUNITY
End: 2022-05-25 | Stop reason: SDUPTHER

## 2020-11-24 NOTE — PROGRESS NOTES
[place] [facility]  Office visit  Ximena Sanchez  1970  [phone]    VISIT DATE:  11/24/2020      PCP: Awilda Frances MD  [address] [address]    CC:  Chief Complaint   Patient presents with   • Palpitations       PROBLEM LIST:  1.  Palpitations                        A.  Stress test in [place] approx 7 years ago.  Irregular rhythm at 145 bpm; good recovery.                         B.  Holter monitor in [place]:  Multifocal palpitations; data deficit  2.  Cardiac murmur                        A.  Echocardiogram 2014: EF 56%, no significant valvular abnormalities.    3.  Hypertension  4.  Osteoarthritis in foot and ankle  5.  Uterine fibroids  6.  Surgeries                        A.  Bilateral lumpectomy- benign    Previous cardiac studies and procedures:  February 2020 echo  · Left ventricular systolic function is normal.  · Estimated EF appears to be in the range of 61 - 65%    ASSESSMENT:   Diagnosis Plan   1. Syncope and collapse     2. Palpitations         PLAN:  Palpitations: Continue bisoprolol.  Continue regular exercise which really appears to help moderate symptoms.    Syncope: Suspect vasovagal etiology.  Currently asymptomatic.  Transthoracic echo revealed normal myocardial structure and function.  Will arrange for ambulatory ECG monitor if she has recurrent episodes of lightheadedness or increase in palpitations.    Daytime fatigue and somnolence: No clinical concern for sleep disorder at this time. Potentially consistent with underlying mild narcolepsy.  Has responded well to as needed modafinil 100 mg p.o. daily.    Subjective  Palpitations are being well controlled as long she exercises on a daily basis.  She is compliant with medical therapy.  She reports that her palpitations are well controlled as long as she exercises and takes her bisoprolol.  Daytime somnolence has significantly improved with taking modafinil on an as-needed basis.  Uses it 3-4  "times per week, only if she is going to be in certain situations such as sitting at her desk alone working which she knows will trigger somnolence.    PHYSICAL EXAMINATION:  Vitals:    11/24/20 0926   BP: 104/70   BP Location: Left arm   Patient Position: Sitting   Pulse: 70   Temp: 97.1 °F (36.2 °C)   Weight: 74.8 kg (165 lb)   Height: 172.7 cm (68\")     General Appearance:    Alert, cooperative, no distress, appears stated age   Head:    Normocephalic, without obvious abnormality, atraumatic   Eyes:    conjunctiva/corneas clear   Nose:   Nares normal, septum midline, mucosa normal, no drainage   Throat:   Lips, teeth and gums normal   Neck:   Supple, symmetrical, trachea midline, no carotid    bruit or JVD   Lungs:     Clear to auscultation bilaterally, respirations unlabored   Chest Wall:    No tenderness or deformity    Heart:    Regular rate and rhythm, S1 and S2 normal, no murmur, rub   or gallop, normal carotid impulse bilaterally without bruit.   Abdomen:     Soft, non-tender   Extremities:   Extremities normal, atraumatic, no cyanosis or edema   Pulses:   2+ and symmetric all extremities   Skin:   Skin color, texture, turgor normal, no rashes or lesions       Diagnostic Data:  Procedures  No results found for: CHLPL, TRIG, HDL, LDLDIRECT  Lab Results   Component Value Date    GLUCOSE 89 12/26/2017    BUN 14 12/26/2017    CREATININE 0.60 12/26/2017     (H) 12/26/2017    K 4.0 12/26/2017     12/26/2017    CO2 26.0 12/26/2017     No results found for: HGBA1C  Lab Results   Component Value Date    WBC 5.23 12/26/2017    HGB 13.2 12/26/2017    HCT 40.4 12/26/2017     12/26/2017       Allergies  Allergies   Allergen Reactions   • Acetaminophen Other (See Comments)     Flu like symptoms     • Amoxicillin Hives   • Codeine GI Intolerance   • Prednisone Other (See Comments)     Low doses tolerated       Current Medications    Current Outpatient Medications:   •  bisoprolol (ZEBeta) 5 MG tablet, " Take 1 tablet by mouth Daily., Disp: 90 tablet, Rfl: 1  •  CloNIDine (CATAPRES) 0.1 MG tablet, Take 1 tablet by mouth Every 12 (Twelve) Hours As Needed for High Blood Pressure (>170/100)., Disp: 30 tablet, Rfl: 3  •  modafinil (PROVIGIL) 100 MG tablet, Take 100 mg by mouth Daily., Disp: , Rfl:   •  PROAIR  (90 Base) MCG/ACT inhaler, inhale 2 puffs by mouth every 4 hours if needed, Disp: , Rfl: 0          ROS  Review of Systems   Cardiovascular: Positive for palpitations. Negative for chest pain and dyspnea on exertion.   Respiratory: Positive for cough and wheezing. Negative for shortness of breath.          SOCIAL HX  Social History     Socioeconomic History   • Marital status:      Spouse name: Not on file   • Number of children: Not on file   • Years of education: Not on file   • Highest education level: Not on file   Tobacco Use   • Smoking status: Never Smoker   • Smokeless tobacco: Never Used   Substance and Sexual Activity   • Alcohol use: Yes     Comment: socially- 1 drink per month   • Drug use: No   • Sexual activity: Defer       FAMILY HX  Family History   Problem Relation Age of Onset   • Arrhythmia Mother    • Atrial fibrillation Mother    • No Known Problems Father    • Arrhythmia Maternal Grandmother    • Atrial fibrillation Maternal Grandmother    • Ovarian cancer Neg Hx    • Breast cancer Neg Hx              Andre Smyth III, MD, [facility]

## 2020-12-08 RX ORDER — BISOPROLOL FUMARATE 5 MG/1
5 TABLET, FILM COATED ORAL DAILY
Qty: 90 TABLET | Refills: 1 | Status: SHIPPED | OUTPATIENT
Start: 2020-12-08 | End: 2021-06-08 | Stop reason: SDUPTHER

## 2021-06-08 ENCOUNTER — OFFICE VISIT (OUTPATIENT)
Dept: CARDIOLOGY | Facility: CLINIC | Age: 51
End: 2021-06-08

## 2021-06-08 VITALS
SYSTOLIC BLOOD PRESSURE: 110 MMHG | OXYGEN SATURATION: 97 % | HEIGHT: 68 IN | DIASTOLIC BLOOD PRESSURE: 68 MMHG | HEART RATE: 75 BPM | WEIGHT: 165 LBS | BODY MASS INDEX: 25.01 KG/M2

## 2021-06-08 DIAGNOSIS — R00.2 PALPITATIONS: Primary | ICD-10-CM

## 2021-06-08 DIAGNOSIS — R55 SYNCOPE AND COLLAPSE: ICD-10-CM

## 2021-06-08 PROCEDURE — 99214 OFFICE O/P EST MOD 30 MIN: CPT | Performed by: INTERNAL MEDICINE

## 2021-06-08 RX ORDER — BISOPROLOL FUMARATE 5 MG/1
5 TABLET, FILM COATED ORAL DAILY
Qty: 30 TABLET | Refills: 11 | Status: SHIPPED | OUTPATIENT
Start: 2021-06-08 | End: 2022-06-20

## 2021-06-08 NOTE — PROGRESS NOTES
[place] [facility]  Office visit  Ximena Sanchez  1970  [phone]    VISIT DATE:  6/8/2021      PCP: Awilda Frances MD  [address] [address]    CC:  Chief Complaint   Patient presents with   • Syncope and collapse   • Palpitations       PROBLEM LIST:  1.  Palpitations                        A.  Stress test in [place] approx 7 years ago.  Irregular rhythm at 145 bpm; good recovery.                         B.  Holter monitor in [place]:  Multifocal palpitations; data deficit  2.  Cardiac murmur                        A.  Echocardiogram 2014: EF 56%, no significant valvular abnormalities.    3.  Hypertension  4.  Osteoarthritis in foot and ankle  5.  Uterine fibroids  6.  Surgeries                        A.  Bilateral lumpectomy- benign    Previous cardiac studies and procedures:  February 2020 echo  · Left ventricular systolic function is normal.  · Estimated EF appears to be in the range of 61 - 65%    ASSESSMENT:   Diagnosis Plan   1. Palpitations     2. Syncope and collapse         PLAN:  Palpitations: Continue bisoprolol.  Continue regular exercise which really appears to help moderate symptoms.    Syncope: Suspect vasovagal etiology.  Currently asymptomatic.  Transthoracic echo revealed normal myocardial structure and function.  Will arrange for ambulatory ECG monitor if she has recurrent episodes of lightheadedness or increase in palpitations.    Daytime fatigue and somnolence: No clinical concern for sleep disorder at this time. Potentially consistent with underlying mild narcolepsy.  Has responded well to as needed modafinil 100 mg p.o. daily as needed.    Subjective  Palpitations are being well controlled as long she exercises on a daily basis.  She is compliant with medical therapy.  Daytime somnolence has significantly improved with taking modafinil on an as-needed basis.  Denies presyncope or syncope.  Ports recent mild flare of allergies with sinus drainage  "and dry cough and fullness sensation in her ears with itchy eyes.    PHYSICAL EXAMINATION:  Vitals:    06/08/21 1548   BP: 110/68   BP Location: Left arm   Patient Position: Sitting   Pulse: 75   SpO2: 97%   Weight: 74.8 kg (165 lb)   Height: 172.7 cm (68\")     General Appearance:    Alert, cooperative, no distress, appears stated age   Head:    Normocephalic, without obvious abnormality, atraumatic   Eyes:    conjunctiva/corneas clear   Nose:   Nares normal, septum midline, mucosa normal, no drainage   Throat:   Lips, teeth and gums normal   Neck:   Supple, symmetrical, trachea midline, no carotid    bruit or JVD   Lungs:     Clear to auscultation bilaterally, respirations unlabored   Chest Wall:    No tenderness or deformity    Heart:    Regular rate and rhythm, S1 and S2 normal, no murmur, rub   or gallop, normal carotid impulse bilaterally without bruit.   Abdomen:     Soft, non-tender   Extremities:   Extremities normal, atraumatic, no cyanosis or edema   Pulses:   2+ and symmetric all extremities   Skin:   Skin color, texture, turgor normal, no rashes or lesions       Diagnostic Data:  Procedures  No results found for: CHLPL, TRIG, HDL, LDLDIRECT  Lab Results   Component Value Date    GLUCOSE 89 12/26/2017    BUN 14 12/26/2017    CREATININE 0.60 12/26/2017     (H) 12/26/2017    K 4.0 12/26/2017     12/26/2017    CO2 26.0 12/26/2017     No results found for: HGBA1C  Lab Results   Component Value Date    WBC 5.23 12/26/2017    HGB 13.2 12/26/2017    HCT 40.4 12/26/2017     12/26/2017       Allergies  Allergies   Allergen Reactions   • Acetaminophen Other (See Comments)     Flu like symptoms     • Amoxicillin Hives   • Codeine GI Intolerance   • Prednisone Other (See Comments)     Low doses tolerated       Current Medications    Current Outpatient Medications:   •  bisoprolol (ZEBeta) 5 MG tablet, Take 1 tablet by mouth Daily., Disp: 30 tablet, Rfl: 11  •  CloNIDine (CATAPRES) 0.1 MG tablet, " Take 1 tablet by mouth Every 12 (Twelve) Hours As Needed for High Blood Pressure (>170/100)., Disp: 30 tablet, Rfl: 3  •  modafinil (PROVIGIL) 100 MG tablet, Take 100 mg by mouth As Needed., Disp: , Rfl:   •  PROAIR  (90 Base) MCG/ACT inhaler, inhale 2 puffs by mouth every 4 hours if needed, Disp: , Rfl: 0          ROS  Review of Systems   Cardiovascular: Positive for palpitations. Negative for chest pain and dyspnea on exertion.   Respiratory: Positive for cough and wheezing. Negative for shortness of breath.          SOCIAL HX  Social History     Socioeconomic History   • Marital status:      Spouse name: Not on file   • Number of children: Not on file   • Years of education: Not on file   • Highest education level: Not on file   Tobacco Use   • Smoking status: Never Smoker   • Smokeless tobacco: Never Used   Vaping Use   • Vaping Use: Never used   Substance and Sexual Activity   • Alcohol use: Yes     Comment: socially- 1 drink per month   • Drug use: No   • Sexual activity: Defer       FAMILY HX  Family History   Problem Relation Age of Onset   • Arrhythmia Mother    • Atrial fibrillation Mother    • No Known Problems Father    • Arrhythmia Maternal Grandmother    • Atrial fibrillation Maternal Grandmother    • Ovarian cancer Neg Hx    • Breast cancer Neg Hx              Andre Smyth III, MD, [facility]

## 2021-06-29 ENCOUNTER — OFFICE VISIT (OUTPATIENT)
Dept: OBSTETRICS AND GYNECOLOGY | Facility: CLINIC | Age: 51
End: 2021-06-29

## 2021-06-29 VITALS
BODY MASS INDEX: 25.43 KG/M2 | HEIGHT: 68 IN | WEIGHT: 167.8 LBS | DIASTOLIC BLOOD PRESSURE: 80 MMHG | SYSTOLIC BLOOD PRESSURE: 120 MMHG

## 2021-06-29 DIAGNOSIS — N93.9 ABNORMAL UTERINE BLEEDING (AUB): ICD-10-CM

## 2021-06-29 DIAGNOSIS — Z01.419 ENCOUNTER FOR ANNUAL ROUTINE GYNECOLOGICAL EXAMINATION: Primary | ICD-10-CM

## 2021-06-29 PROCEDURE — 58100 BIOPSY OF UTERUS LINING: CPT | Performed by: OBSTETRICS & GYNECOLOGY

## 2021-06-29 PROCEDURE — 99386 PREV VISIT NEW AGE 40-64: CPT | Performed by: OBSTETRICS & GYNECOLOGY

## 2021-06-29 RX ORDER — NEOMYCIN SULFATE, POLYMYXIN B SULFATE, AND DEXAMETHASONE 3.5; 10000; 1 MG/G; [USP'U]/G; MG/G
OINTMENT OPHTHALMIC
COMMUNITY
Start: 2021-06-17 | End: 2022-01-11

## 2021-06-29 NOTE — PROGRESS NOTES
GYN Annual Exam     CC - Here for annual exam.        HPI  Ximena Sanchez is a 50 y.o. female, , who presents for annual well woman exam.  She is premenopausal.  Periods are absent, secondary to history of endometrial ablation. Patient reports problems with: vaginal spotting with daily bowel movements; first began in the past 4-5 weeks. Variant in heaviness of flow; denies pain, but mild and intermittent cramping does occur. Occasionally bright red, occasionally dark blood. Also with occasional spotting post-coitus. There were no changes to her medical or surgical history since her last visit. Partner Status: Marital Status: .  New Partners since last visit: no.      Additional OB/GYN History   Current contraception: contraceptive methods: None; history endometrial ablation    On HRT? No  Last Pap :   Last Completed Pap Smear          Ordered - PAP SMEAR (Every 3 Years) Ordered on 2021    05/15/2020  Done - negative              History of abnormal Pap smear: yes - led to cryo  Family history of uterine, colon, breast, or ovarian cancer: no  Performs monthly Self-Breast Exam: no  Last mammogram:   Last Completed Mammogram          MAMMOGRAM (Every 2 Years) Next due on 2019  Mammo Screening Digital Tomosynthesis Bilateral With CAD    2016  Mammo Screening Digital Tomosynthesis Bilateral With CAD              Last colonoscopy: none since her 20's  Last Completed Colonoscopy     This patient has no relevant Health Maintenance data.        Last DEXA: On 2019 and results were Normal  Exercises Regularly: yes  Feelings of Anxiety or Depression: no      Tobacco Usage?: No   OB History        2    Para   1    Term   1            AB   1    Living   1       SAB   1    TAB        Ectopic        Molar        Multiple        Live Births   1                Health Maintenance   Topic Date Due   • Annual Gynecologic Pelvic and Breast Exam  Never done   • COLORECTAL  "CANCER SCREENING  Never done   • ANNUAL PHYSICAL  Never done   • TDAP/TD VACCINES (1 - Tdap) Never done   • HEPATITIS C SCREENING  Never done   • ZOSTER VACCINE (1 of 2) Never done   • MAMMOGRAM  07/24/2021   • INFLUENZA VACCINE  08/01/2021   • PAP SMEAR  05/15/2023   • COVID-19 Vaccine  Completed   • Pneumococcal Vaccine 0-64  Aged Out       The additional following portions of the patient's history were reviewed and updated as appropriate: allergies, current medications, past family history, past medical history, past social history, past surgical history and problem list.    Review of Systems   Constitutional: Negative.    HENT: Negative.    Eyes: Negative.    Respiratory: Negative.    Cardiovascular: Negative.    Gastrointestinal: Negative.    Endocrine: Negative.    Genitourinary: Positive for vaginal bleeding.   Musculoskeletal: Negative.    Skin: Negative.    Allergic/Immunologic: Negative.    Neurological: Negative.    Hematological: Negative.    Psychiatric/Behavioral: Negative.        I have reviewed and agree with the HPI, ROS, and historical information as entered above. Mak Altamirano MD    Objective   /80 (BP Location: Right arm, Patient Position: Sitting, Cuff Size: Adult)   Ht 172.7 cm (68\")   Wt 76.1 kg (167 lb 12.8 oz)   Breastfeeding No   BMI 25.51 kg/m²     Physical Exam  Vitals and nursing note reviewed. Exam conducted with a chaperone present.   Constitutional:       Appearance: She is well-developed.   HENT:      Head: Normocephalic and atraumatic.   Neck:      Thyroid: No thyroid mass or thyromegaly.   Cardiovascular:      Rate and Rhythm: Normal rate and regular rhythm.      Heart sounds: No murmur heard.     Pulmonary:      Effort: Pulmonary effort is normal. No retractions.      Breath sounds: Normal breath sounds. No wheezing, rhonchi or rales.   Chest:      Chest wall: No mass or tenderness.      Breasts:         Right: Normal. No mass, nipple discharge, skin change or " tenderness.         Left: Normal. No mass, nipple discharge, skin change or tenderness.   Abdominal:      General: Bowel sounds are normal.      Palpations: Abdomen is soft. Abdomen is not rigid. There is no mass.      Tenderness: There is no abdominal tenderness. There is no guarding.      Hernia: No hernia is present. There is no hernia in the left inguinal area.   Genitourinary:     Labia:         Right: No rash, tenderness or lesion.         Left: No rash, tenderness or lesion.       Vagina: Normal. No vaginal discharge or lesions.      Cervix: No cervical motion tenderness, discharge, lesion or cervical bleeding.      Uterus: Normal. Not enlarged, not fixed and not tender.       Adnexa:         Right: No mass or tenderness.          Left: No mass or tenderness.        Rectum: No external hemorrhoid.   Musculoskeletal:      Cervical back: Normal range of motion. No muscular tenderness.   Neurological:      Mental Status: She is alert and oriented to person, place, and time.   Psychiatric:         Behavior: Behavior normal.            Assessment and Plan    Problem List Items Addressed This Visit     None      Visit Diagnoses     Encounter for annual routine gynecological examination    -  Primary    Relevant Orders    IGP, Rfx Aptima HPV ASCU    Abnormal uterine bleeding (AUB)        Relevant Orders    Tissue Pathology Exam          1. GYN annual well woman exam.   2. Reviewed monthly self breast exams.  Instructed to call with lumps, pain, or breast discharge.  Yearly mammograms ordered.  3. Recommended use of Vitamin D and getting adequate calcium in her diet. (1500mg)  4. Reviewed exercise as a preventative health measures.   5. Reviewed BMI and weight loss as preventative health measures.   6. Colonoscopy recommended.  7. Symptoms of menopausal transition reviewed with patient.   8. Other: Patient has had ablation.  She has not had symptoms of menopause yet and has been spotting for the last month.  She has  not missed bleeding for a year.  It is hard to know although since she has had ablation.  We decided to do a endometrial biopsy which once attempted but only going about 4 to 5 cm.  Minimal tissue was gotten.  We also did an ultrasound which showed a normal uterus and ovaries this was a portable ultrasound which I did in the room on my own machine for no charge.  We are going to not treat her with any medication as she does not like to take hormones.  We will await the biopsy and further bleeding.  Will be difficult to do a D&C as she has had an ablation.  I think she is still premenopausal this is just regular bleeding with some irregularity due to the ablation.    Mak Altamirano MD  06/29/2021

## 2021-06-30 NOTE — PROGRESS NOTES
Endometrial Biopsy    Ximena Sanchez is a 50 y.o. female,   , whose last menstrual period was No LMP recorded. Patient has had an ablation..  The patient has a history of dysfunctional uterine bleeding and presents for an endometrial biopsy.  Patient reports vaginal bleeding.  She reports the bleeding as light. It has previously occurred frequently.  Patient has been using none.  Patient also complains of none.  .  After the indications, risks, benefits, and alternatives to performing and endometrial biopsy were explained to the patient, .  A urine pregnancy test was negative.     PROCEDURE:  The patient was placed on the table in the supine lithotomy position.  She was draped in the appropriate manner.  A speculum was placed in the vagina.  The cervix was visualized and prepped with Betadine. A tenaculum was placed. A small plastic 5 mm Pipelle syringe curette was inserted into the cervical canal.  The uterus was sounded to 5 cms.  A vigorous four quadrant biopsy was performed, removing a small amount of tissue.  This tissue was placed in Formalin and sent to pathology.  The patient tolerated the procedure well and reported No cramping.  She had No cramping at the time of discharge.  Physical Exam  Vitals and nursing note reviewed. Exam conducted with a chaperone present.   Genitourinary:     Labia:         Right: No rash, tenderness or lesion.         Left: No rash, tenderness or lesion.       Vagina: Normal. No lesions.      Cervix: No cervical motion tenderness, discharge, lesion or cervical bleeding.      Uterus: Normal. Not enlarged, not fixed and not tender.       Adnexa:         Right: No mass or tenderness.          Left: No mass or tenderness.        Rectum: No external hemorrhoid.      Comments: Chaperone Present        Procedures    Review of Systems      Plan:  No orders of the defined types were placed in this encounter.      Problem List Items Addressed This Visit     None      Visit Diagnoses      Encounter for annual routine gynecological examination    -  Primary    Relevant Orders    IGP, Rfx Aptima HPV ASCU    Abnormal uterine bleeding (AUB)        Relevant Orders    Tissue Pathology Exam              Instructions  1. Call the office in 5 business days for biopsy results.  2. Patient instructed to call the office if develops a fever of 100.4 or greater, vaginal bleeding heavier than a period, foul vaginal discharge or pain.      Mak Altamirano MD

## 2021-07-06 DIAGNOSIS — N93.9 ABNORMAL UTERINE BLEEDING (AUB): ICD-10-CM

## 2021-07-26 ENCOUNTER — TELEPHONE (OUTPATIENT)
Dept: CARDIOLOGY | Facility: CLINIC | Age: 51
End: 2021-07-26

## 2021-07-26 DIAGNOSIS — R07.9 CHEST PAIN AT REST: ICD-10-CM

## 2021-07-26 DIAGNOSIS — I20.8 ANGINA AT REST (HCC): Primary | ICD-10-CM

## 2021-07-26 NOTE — TELEPHONE ENCOUNTER
Notified of message above from .Verbalized understanding. Knows to go to the ED to be evaluated if it happens again.

## 2021-07-26 NOTE — TELEPHONE ENCOUNTER
Last Friday evening she was watching TV and all of a sudden she did not feel well. Felt nauseated. Mid sternal chest pain that radiated to left side of chest to her Lt arm. Lasted awhile.Not sure how long exactly. Could not sleep for awhile.Chest was tender to touch. No dyspnea pr palpitations with it. Did not go anywhere to be checked. States was cleaning her pool earlier that day for @ 2-3 hours. Wants to know if she needs to be seen or have anything done? Please advise.

## 2021-07-27 NOTE — TELEPHONE ENCOUNTER
Does not want to do this type of stress test because she does not want to hold her beta blocker for 48 hours prior to test. Please advise.

## 2021-07-28 NOTE — TELEPHONE ENCOUNTER
Notified of message above from . Agreed to the coronary CTA. Instructed her that our  would be calling her to set this up.Verbalized understanding. Message sent to our  to discontinue the stress echo.

## 2021-08-04 ENCOUNTER — TELEPHONE (OUTPATIENT)
Dept: OBSTETRICS AND GYNECOLOGY | Facility: CLINIC | Age: 51
End: 2021-08-04

## 2021-08-04 DIAGNOSIS — N93.9 ABNORMAL UTERINE BLEEDING (AUB): Primary | ICD-10-CM

## 2021-08-04 NOTE — TELEPHONE ENCOUNTER
Patient wants to now have labs completed per last discussion with Dr. Mak Altamirano to see if she is pre-menopausal.    Patient wanting to go somewhere local for her in [place], [place].

## 2021-08-04 NOTE — TELEPHONE ENCOUNTER
Informed patient that this RN could place orders and she can have them drawn at any [facility] facility. Also offered fax of orders; patient declined.    After consult with MD Nimisha, orders on chart for draw.

## 2021-08-09 ENCOUNTER — LAB (OUTPATIENT)
Dept: LAB | Facility: HOSPITAL | Age: 51
End: 2021-08-09

## 2021-08-09 DIAGNOSIS — N93.9 ABNORMAL UTERINE BLEEDING (AUB): ICD-10-CM

## 2021-08-09 LAB
ESTRADIOL SERPL HS-MCNC: 7.4 PG/ML
FSH SERPL-ACNC: 59.2 MIU/ML
LH SERPL-ACNC: 46.2 MIU/ML
PROGEST SERPL-MCNC: 0.15 NG/ML

## 2021-08-09 PROCEDURE — 84144 ASSAY OF PROGESTERONE: CPT

## 2021-08-09 PROCEDURE — 83001 ASSAY OF GONADOTROPIN (FSH): CPT

## 2021-08-09 PROCEDURE — 83002 ASSAY OF GONADOTROPIN (LH): CPT

## 2021-08-09 PROCEDURE — 36415 COLL VENOUS BLD VENIPUNCTURE: CPT

## 2021-08-09 PROCEDURE — 82670 ASSAY OF TOTAL ESTRADIOL: CPT

## 2021-08-18 ENCOUNTER — APPOINTMENT (OUTPATIENT)
Dept: CARDIOLOGY | Facility: HOSPITAL | Age: 51
End: 2021-08-18

## 2021-09-02 ENCOUNTER — TELEPHONE (OUTPATIENT)
Dept: INFUSION THERAPY | Facility: HOSPITAL | Age: 51
End: 2021-09-02

## 2021-09-03 ENCOUNTER — HOSPITAL ENCOUNTER (OUTPATIENT)
Dept: CT IMAGING | Facility: HOSPITAL | Age: 51
Discharge: HOME | End: 2021-09-03
Admitting: INTERNAL MEDICINE

## 2021-09-03 VITALS
OXYGEN SATURATION: 99 % | HEART RATE: 69 BPM | DIASTOLIC BLOOD PRESSURE: 89 MMHG | WEIGHT: 169.2 LBS | RESPIRATION RATE: 18 BRPM | SYSTOLIC BLOOD PRESSURE: 132 MMHG | BODY MASS INDEX: 25.64 KG/M2 | HEIGHT: 68 IN | TEMPERATURE: 97.6 F

## 2021-09-03 DIAGNOSIS — R07.9 CHEST PAIN AT REST: ICD-10-CM

## 2021-09-03 PROCEDURE — 82565 ASSAY OF CREATININE: CPT

## 2021-09-03 PROCEDURE — 75574 CT ANGIO HRT W/3D IMAGE: CPT

## 2021-09-03 PROCEDURE — 0 IOPAMIDOL PER 1 ML: Performed by: INTERNAL MEDICINE

## 2021-09-03 RX ORDER — LIDOCAINE HYDROCHLORIDE 10 MG/ML
5 INJECTION, SOLUTION EPIDURAL; INFILTRATION; INTRACAUDAL; PERINEURAL AS NEEDED
Status: DISCONTINUED | OUTPATIENT
Start: 2021-09-03 | End: 2021-09-04 | Stop reason: HOSPADM

## 2021-09-03 RX ORDER — SODIUM CHLORIDE 0.9 % (FLUSH) 0.9 %
3 SYRINGE (ML) INJECTION EVERY 12 HOURS SCHEDULED
Status: DISCONTINUED | OUTPATIENT
Start: 2021-09-03 | End: 2021-09-04 | Stop reason: HOSPADM

## 2021-09-03 RX ORDER — METOPROLOL TARTRATE 50 MG/1
100 TABLET, FILM COATED ORAL ONCE AS NEEDED
Status: COMPLETED | OUTPATIENT
Start: 2021-09-03 | End: 2021-09-03

## 2021-09-03 RX ORDER — NITROGLYCERIN 0.4 MG/1
TABLET SUBLINGUAL
Status: COMPLETED
Start: 2021-09-03 | End: 2021-09-03

## 2021-09-03 RX ORDER — SODIUM CHLORIDE 0.9 % (FLUSH) 0.9 %
10 SYRINGE (ML) INJECTION AS NEEDED
Status: DISCONTINUED | OUTPATIENT
Start: 2021-09-03 | End: 2021-09-04 | Stop reason: HOSPADM

## 2021-09-03 RX ORDER — METOPROLOL TARTRATE 50 MG/1
50 TABLET, FILM COATED ORAL ONCE AS NEEDED
Status: COMPLETED | OUTPATIENT
Start: 2021-09-03 | End: 2021-09-03

## 2021-09-03 RX ORDER — NITROGLYCERIN 0.4 MG/1
0.4 TABLET SUBLINGUAL
Status: COMPLETED | OUTPATIENT
Start: 2021-09-03 | End: 2021-09-03

## 2021-09-03 RX ADMIN — METOPROLOL TARTRATE 50 MG: 50 TABLET ORAL at 07:52

## 2021-09-03 RX ADMIN — NITROGLYCERIN 0.4 MG: 0.4 TABLET SUBLINGUAL at 09:15

## 2021-09-03 RX ADMIN — IOPAMIDOL 100 ML: 755 INJECTION, SOLUTION INTRAVENOUS at 09:30

## 2021-09-04 LAB — CREAT BLDA-MCNC: 0.7 MG/DL (ref 0.6–1.3)

## 2021-09-13 ENCOUNTER — TELEPHONE (OUTPATIENT)
Dept: CARDIOLOGY | Facility: CLINIC | Age: 51
End: 2021-09-13

## 2021-09-13 NOTE — TELEPHONE ENCOUNTER
----- Message from Andre Smyth III, MD sent at 9/13/2021  1:24 PM EDT -----  Normal CT angiography of her heart.  No evidence of blockages.  Very reassuring.

## 2021-11-04 ENCOUNTER — TELEPHONE (OUTPATIENT)
Dept: CARDIOLOGY | Facility: CLINIC | Age: 51
End: 2021-11-04

## 2021-11-04 NOTE — TELEPHONE ENCOUNTER
Has a knee injury and her Ortho doctor prescribed her Meloxicam 7.5 mg daily. She has been on it for 3 days.Wants to know if its safe for her to take? Please advise.

## 2022-01-11 ENCOUNTER — OFFICE VISIT (OUTPATIENT)
Dept: CARDIOLOGY | Facility: CLINIC | Age: 52
End: 2022-01-11

## 2022-01-11 VITALS
WEIGHT: 173 LBS | SYSTOLIC BLOOD PRESSURE: 112 MMHG | OXYGEN SATURATION: 96 % | HEIGHT: 68 IN | DIASTOLIC BLOOD PRESSURE: 64 MMHG | HEART RATE: 77 BPM | BODY MASS INDEX: 26.22 KG/M2

## 2022-01-11 DIAGNOSIS — R55 SYNCOPE AND COLLAPSE: ICD-10-CM

## 2022-01-11 DIAGNOSIS — R00.2 PALPITATIONS: Primary | ICD-10-CM

## 2022-01-11 DIAGNOSIS — R07.2 PRECORDIAL PAIN: ICD-10-CM

## 2022-01-11 PROCEDURE — 99214 OFFICE O/P EST MOD 30 MIN: CPT | Performed by: INTERNAL MEDICINE

## 2022-01-11 RX ORDER — MELOXICAM 7.5 MG/1
7.5 TABLET ORAL AS NEEDED
COMMUNITY
Start: 2021-11-27 | End: 2022-07-12

## 2022-01-11 NOTE — PROGRESS NOTES
[facility] at [facility]  Office visit  Ximena Sanchez  1970  [phone]    VISIT DATE:  1/11/2022      PCP: Awilda Frances MD  [address] [address]    CC:  Chief Complaint   Patient presents with   • Palpitations       PROBLEM LIST:  1.  Palpitations                        A.  Stress test in [place] approx 7 years ago.  Irregular rhythm at 145 bpm; good recovery.                         B.  Holter monitor in [place]:  Multifocal palpitations; data deficit  2.  Cardiac murmur                        A.  Echocardiogram 2014: EF 56%, no significant valvular abnormalities.    3.  Hypertension  4.  Osteoarthritis in foot and ankle  5.  Uterine fibroids  6.  Surgeries                        A.  Bilateral lumpectomy- benign    Previous cardiac studies and procedures:  February 2020 echo  · Left ventricular systolic function is normal.  · Estimated EF appears to be in the range of 61 - 65%    September 2021 coronary CTA  1. Calcium score datasets detected 0 distinct calcified plaque lesions  placing patient in the no identifiable   calcification category with a calcium score of 0.0.  2. Structurally normal appearing cardiac anatomy without evidence for  valvular calcification. Cardiac size within normal limits without  pericardial effusion or disease.   3. Normal left atrial anatomy with normal left atrial appendage. No  central pulmonary filling defect or thrombus identified.  4. Normal appearing coronary anatomy without evidence for coronary  stenosis, plaque or origin anomaly.  5. No significant abnormalities of the adjacent mediastinum or lungs and  widened lung windows.    ASSESSMENT:   Diagnosis Plan   1. Palpitations     2. Syncope and collapse     3. Precordial pain         PLAN:  Palpitations: Continue bisoprolol.  Continue regular exercise which really appears to help moderate symptoms.    Syncope: Suspect vasovagal etiology.  Currently asymptomatic.  Transthoracic echo  "revealed normal myocardial structure and function.  Will arrange for ambulatory ECG monitor if she has recurrent episodes of lightheadedness or increase in palpitations.    Daytime fatigue and somnolence: No clinical concern for sleep disorder at this time. Potentially consistent with underlying mild narcolepsy.  Has responded well to as needed modafinil 100 mg p.o. daily as needed.    Chest pain, noncardiac: No recurrence.  Has undergone a low risk evaluation with normal coronary CTA which was also remarkable for 0 coronary calcification.    Subjective  Palpitations are being well controlled as long she exercises on a daily basis.  She is compliant with medical therapy.  Daytime somnolence has significantly improved with taking modafinil on an as-needed basis, currently taking 5 days/week.  Denies presyncope or syncope.      PHYSICAL EXAMINATION:  Vitals:    01/11/22 1331   BP: 112/64   BP Location: Right arm   Patient Position: Sitting   Pulse: 77   SpO2: 96%   Weight: 78.5 kg (173 lb)   Height: 172.7 cm (68\")     General Appearance:    Alert, cooperative, no distress, appears stated age   Head:    Normocephalic, without obvious abnormality, atraumatic   Eyes:    conjunctiva/corneas clear   Nose:   Nares normal, septum midline, mucosa normal, no drainage   Throat:   Lips, teeth and gums normal   Neck:   Supple, symmetrical, trachea midline, no carotid    bruit or JVD   Lungs:     Clear to auscultation bilaterally, respirations unlabored   Chest Wall:    No tenderness or deformity    Heart:    Regular rate and rhythm, S1 and S2 normal, no murmur, rub   or gallop, normal carotid impulse bilaterally without bruit.   Abdomen:     Soft, non-tender   Extremities:   Extremities normal, atraumatic, no cyanosis or edema   Pulses:   2+ and symmetric all extremities   Skin:   Skin color, texture, turgor normal, no rashes or lesions       Diagnostic Data:  Procedures  No results found for: CHLPL, TRIG, HDL, LDLDIRECT  Lab " Results   Component Value Date    GLUCOSE 89 12/26/2017    BUN 14 12/26/2017    CREATININE 0.70 09/03/2021     (H) 12/26/2017    K 4.0 12/26/2017     12/26/2017    CO2 26.0 12/26/2017     No results found for: HGBA1C  Lab Results   Component Value Date    WBC 5.23 12/26/2017    HGB 13.2 12/26/2017    HCT 40.4 12/26/2017     12/26/2017       Allergies  Allergies   Allergen Reactions   • Acetaminophen Other (See Comments)     Flu like symptoms     • Amoxicillin Hives   • Codeine GI Intolerance   • Prednisone Other (See Comments)     Low doses tolerated; tremors with higher doses       Current Medications    Current Outpatient Medications:   •  bisoprolol (ZEBeta) 5 MG tablet, Take 1 tablet by mouth Daily., Disp: 30 tablet, Rfl: 11  •  CloNIDine (CATAPRES) 0.1 MG tablet, Take 1 tablet by mouth Every 12 (Twelve) Hours As Needed for High Blood Pressure (>170/100)., Disp: 30 tablet, Rfl: 3  •  meloxicam (MOBIC) 7.5 MG tablet, Take 7.5 mg by mouth As Needed., Disp: , Rfl:   •  modafinil (PROVIGIL) 100 MG tablet, Take 100 mg by mouth As Needed., Disp: , Rfl:   •  ProAir  (90 Base) MCG/ACT inhaler, Inhale 2 puffs Every 4 (Four) Hours As Needed for Wheezing., Disp: 8.5 g, Rfl: 1          ROS  Review of Systems   Cardiovascular: Positive for palpitations. Negative for chest pain and dyspnea on exertion.   Respiratory: Positive for cough and wheezing. Negative for shortness of breath.          SOCIAL HX  Social History     Socioeconomic History   • Marital status:    Tobacco Use   • Smoking status: Never Smoker   • Smokeless tobacco: Never Used   Vaping Use   • Vaping Use: Never used   Substance and Sexual Activity   • Alcohol use: Yes     Comment: 0-1 drink per month   • Drug use: Never   • Sexual activity: Yes     Partners: Male     Birth control/protection: Surgical       FAMILY HX  Family History   Problem Relation Age of Onset   • Arrhythmia Mother    • Atrial fibrillation Mother    •  Hypertension Mother    • Thyroid disease Mother    • No Known Problems Father    • Arrhythmia Maternal Grandmother    • Atrial fibrillation Maternal Grandmother    • Hypertension Maternal Grandmother    • Stroke Maternal Grandmother    • Thyroid disease Maternal Grandmother    • Ovarian cancer Neg Hx    • Breast cancer Neg Hx    • Colon cancer Neg Hx              Andre Smyth III, MD, [facility]

## 2022-05-25 DIAGNOSIS — R40.0 SOMNOLENCE: Primary | ICD-10-CM

## 2022-05-25 RX ORDER — MODAFINIL 100 MG/1
100 TABLET ORAL DAILY PRN
Qty: 30 TABLET | Refills: 5 | Status: SHIPPED | OUTPATIENT
Start: 2022-05-25 | End: 2022-12-13

## 2022-06-20 RX ORDER — BISOPROLOL FUMARATE 5 MG/1
5 TABLET, FILM COATED ORAL DAILY
Qty: 30 TABLET | Refills: 3 | Status: SHIPPED | OUTPATIENT
Start: 2022-06-20 | End: 2022-10-13

## 2022-06-28 ENCOUNTER — TRANSCRIBE ORDERS (OUTPATIENT)
Dept: ADMINISTRATIVE | Facility: HOSPITAL | Age: 52
End: 2022-06-28

## 2022-06-28 DIAGNOSIS — Z12.31 VISIT FOR SCREENING MAMMOGRAM: Primary | ICD-10-CM

## 2022-07-05 ENCOUNTER — OFFICE VISIT (OUTPATIENT)
Dept: OBSTETRICS AND GYNECOLOGY | Facility: CLINIC | Age: 52
End: 2022-07-05

## 2022-07-05 VITALS
WEIGHT: 167 LBS | BODY MASS INDEX: 25.31 KG/M2 | DIASTOLIC BLOOD PRESSURE: 80 MMHG | HEIGHT: 68 IN | SYSTOLIC BLOOD PRESSURE: 124 MMHG

## 2022-07-05 DIAGNOSIS — N95.2 ATROPHIC VAGINITIS: ICD-10-CM

## 2022-07-05 DIAGNOSIS — B37.31 VULVOVAGINITIS DUE TO YEAST: ICD-10-CM

## 2022-07-05 DIAGNOSIS — R30.0 DYSURIA: ICD-10-CM

## 2022-07-05 DIAGNOSIS — Z01.419 PAP TEST, AS PART OF ROUTINE GYNECOLOGICAL EXAMINATION: Primary | ICD-10-CM

## 2022-07-05 LAB
BILIRUB BLD-MCNC: NEGATIVE MG/DL
GLUCOSE UR STRIP-MCNC: NEGATIVE MG/DL
KETONES UR QL: NEGATIVE
LEUKOCYTE EST, POC: NEGATIVE
NITRITE UR-MCNC: NEGATIVE MG/ML
PH UR: 5.5 [PH] (ref 5–8)
PROT UR STRIP-MCNC: NEGATIVE MG/DL
RBC # UR STRIP: NEGATIVE /UL
SP GR UR: 1.02 (ref 1–1.03)
UROBILINOGEN UR QL: NORMAL

## 2022-07-05 PROCEDURE — 99396 PREV VISIT EST AGE 40-64: CPT | Performed by: OBSTETRICS & GYNECOLOGY

## 2022-07-05 PROCEDURE — 81002 URINALYSIS NONAUTO W/O SCOPE: CPT | Performed by: OBSTETRICS & GYNECOLOGY

## 2022-07-05 RX ORDER — ESTRADIOL 0.1 MG/G
CREAM VAGINAL
Qty: 42.5 G | Refills: 12 | Status: SHIPPED | OUTPATIENT
Start: 2022-07-05

## 2022-07-05 RX ORDER — FLUCONAZOLE 150 MG/1
TABLET ORAL
Qty: 4 TABLET | Refills: 2 | Status: SHIPPED | OUTPATIENT
Start: 2022-07-05

## 2022-07-05 NOTE — PROGRESS NOTES
GYN Annual Exam     CC - Here for annual exam.        HPI  Ximena Sanchez is a 51 y.o. female, , who presents for annual well woman exam.  She is postmenopausal.  Patient denies vaginal bleeding. ..  Patient reports problems with: hot flashes, night sweats, insomnia, anxiety, fatigue, weight gain, 'forgetfullness,' patient c/o intermittent dysuria and pelvic pain with lose BM. There were no changes to her medical or surgical history since her last visit.. Partner Status: Marital Status: .  She is sexually active. She has not had new partners since her last STD testing. She does not desire STD testing. STD testing recommendations have been explained to the patient.. STD testing recommendations have been explained to the patient and she does not desire STD testing.    Patient is c/o worsening menopausal s/s. H/o ablation but states had regular periods after, now with no bleeding x1 year. She is asking to discuss options. States followed by cardiology and limited on meds she can take.     Additional OB/GYN History   Current contraception: contraceptive methods: Tubal ligation  Desires to: do not start contraception  On HRT? No  Last Pap : 2021. Results: negative  Last Completed Pap Smear     This patient has no relevant Health Maintenance data.        History of abnormal Pap smear: yes - in her 20's, h/o cryo  Family history of uterine, colon, breast, or ovarian cancer: no  Performs monthly Self-Breast Exam: yes  Last mammogram: . Done at .    Last Completed Mammogram     This patient has no relevant Health Maintenance data.        Last colonoscopy:   Last Completed Colonoscopy     This patient has no relevant Health Maintenance data.        Last DEXA: None  Exercises Regularly: yes  Feelings of Anxiety or Depression: yes - anxiety      Tobacco Usage?: No   OB History        2    Para   1    Term   1            AB   1    Living   1       SAB   1    IAB        Ectopic        Molar  "       Multiple        Live Births   1                Health Maintenance   Topic Date Due   • COLORECTAL CANCER SCREENING  Never done   • ANNUAL PHYSICAL  Never done   • TDAP/TD VACCINES (1 - Tdap) Never done   • HEPATITIS C SCREENING  Never done   • ZOSTER VACCINE (1 of 2) Never done   • MAMMOGRAM  07/24/2020   • COVID-19 Vaccine (3 - Booster for Larissa series) 04/27/2022   • PAP SMEAR  06/29/2022   • Annual Gynecologic Pelvic and Breast Exam  06/30/2022   • INFLUENZA VACCINE  10/01/2022   • Pneumococcal Vaccine 0-64  Aged Out       The additional following portions of the patient's history were reviewed and updated as appropriate: allergies, current medications, past family history, past medical history, past social history and past surgical history.    Review of Systems   Constitutional: Positive for fatigue and unexpected weight gain.   HENT: Negative.    Eyes: Negative.    Respiratory: Negative.    Cardiovascular: Negative.    Gastrointestinal: Negative.    Endocrine: Negative.    Genitourinary: Positive for dyspareunia.   Musculoskeletal: Negative.    Skin: Negative.    Allergic/Immunologic: Negative.    Neurological: Negative.    Hematological: Negative.    Psychiatric/Behavioral: The patient is nervous/anxious.        I have reviewed and agree with the HPI, ROS, and historical information as entered above. Mak Altamirano MD    Objective   /80   Ht 172.7 cm (68\")   Wt 75.8 kg (167 lb)   BMI 25.39 kg/m²     Physical Exam  Vitals and nursing note reviewed. Exam conducted with a chaperone present.   Constitutional:       Appearance: She is well-developed.   HENT:      Head: Normocephalic and atraumatic.   Neck:      Thyroid: No thyroid mass or thyromegaly.   Cardiovascular:      Rate and Rhythm: Normal rate and regular rhythm.      Heart sounds: No murmur heard.  Pulmonary:      Effort: Pulmonary effort is normal. No retractions.      Breath sounds: Normal breath sounds. No wheezing, rhonchi or rales. "   Chest:      Chest wall: No mass or tenderness.   Breasts:      Right: Normal. No mass, nipple discharge, skin change or tenderness.      Left: Normal. No mass, nipple discharge, skin change or tenderness.       Abdominal:      General: Bowel sounds are normal.      Palpations: Abdomen is soft. Abdomen is not rigid. There is no mass.      Tenderness: There is no abdominal tenderness. There is no guarding.      Hernia: No hernia is present. There is no hernia in the left inguinal area.   Genitourinary:     Labia:         Right: No rash, tenderness or lesion.         Left: No rash, tenderness or lesion.       Vagina: Normal. No vaginal discharge or lesions.      Cervix: No cervical motion tenderness, discharge, lesion or cervical bleeding.      Uterus: Normal. Not enlarged, not fixed and not tender.       Adnexa:         Right: No mass or tenderness.          Left: No mass or tenderness.        Rectum: No external hemorrhoid.          Comments: Rash   Musculoskeletal:      Cervical back: Normal range of motion. No muscular tenderness.   Neurological:      Mental Status: She is alert and oriented to person, place, and time.   Psychiatric:         Behavior: Behavior normal.            Assessment and Plan    Problem List Items Addressed This Visit    None     Visit Diagnoses     Pap test, as part of routine gynecological examination    -  Primary    Relevant Orders    LIQUID-BASED PAP SMEAR, [facility] ([facility])    CBC & Differential    Comprehensive Metabolic Panel    Hemoglobin A1c    Lipid Panel    Vitamin D 25 Hydroxy    TSH    T4, Free    Follicle Stimulating Hormone    Estradiol    Dysuria        Relevant Orders    POC Urinalysis Dipstick (Completed)    Vulvovaginitis due to yeast        Relevant Medications    fluconazole (Diflucan) 150 MG tablet    Atrophic vaginitis          will use vaseline     1. GYN annual well woman exam.   2. Reviewed monthly self breast exams.  Instructed to call with lumps, pain, or  breast discharge.  Yearly mammograms ordered.  3. Ordered mammogram today.  4. Colonoscopy recommended.  5. Symptoms of menopausal transition reviewed with patient.  Reviewed risks/benefits of OTC, non-hormonal and hormonal treatment for vasomotor and other menopausal symptoms.  6. RTC in 1 year or PRN with problems.  7. Other: will try soy and herbal   8. Does not want to use Hormones   9. No follow-ups on file.     Mak Altamirano MD  07/05/2022

## 2022-07-06 LAB
25(OH)D3+25(OH)D2 SERPL-MCNC: 54.2 NG/ML (ref 30–100)
ALBUMIN SERPL-MCNC: 4.6 G/DL (ref 3.5–5.2)
ALBUMIN/GLOB SERPL: 1.6 G/DL
ALP SERPL-CCNC: 92 U/L (ref 39–117)
ALT SERPL-CCNC: 15 U/L (ref 1–33)
AST SERPL-CCNC: 17 U/L (ref 1–32)
BASOPHILS # BLD AUTO: 0.04 10*3/MM3 (ref 0–0.2)
BASOPHILS NFR BLD AUTO: 0.7 % (ref 0–1.5)
BILIRUB SERPL-MCNC: 0.4 MG/DL (ref 0–1.2)
BUN SERPL-MCNC: 15 MG/DL (ref 6–20)
BUN/CREAT SERPL: 20.8 (ref 7–25)
CALCIUM SERPL-MCNC: 9.9 MG/DL (ref 8.6–10.5)
CHLORIDE SERPL-SCNC: 106 MMOL/L (ref 98–107)
CHOLEST SERPL-MCNC: 192 MG/DL (ref 0–200)
CO2 SERPL-SCNC: 25 MMOL/L (ref 22–29)
CREAT SERPL-MCNC: 0.72 MG/DL (ref 0.57–1)
EGFRCR SERPLBLD CKD-EPI 2021: 100.1 ML/MIN/1.73
EOSINOPHIL # BLD AUTO: 0.17 10*3/MM3 (ref 0–0.4)
EOSINOPHIL NFR BLD AUTO: 3 % (ref 0.3–6.2)
ERYTHROCYTE [DISTWIDTH] IN BLOOD BY AUTOMATED COUNT: 12.9 % (ref 12.3–15.4)
ESTRADIOL SERPL-MCNC: 35.3 PG/ML
FSH SERPL-ACNC: 57 MIU/ML
GLOBULIN SER CALC-MCNC: 2.8 GM/DL
GLUCOSE SERPL-MCNC: 88 MG/DL (ref 65–99)
HBA1C MFR BLD: 5.4 % (ref 4.8–5.6)
HCT VFR BLD AUTO: 38.8 % (ref 34–46.6)
HDLC SERPL-MCNC: 62 MG/DL (ref 40–60)
HGB BLD-MCNC: 12.9 G/DL (ref 12–15.9)
IMM GRANULOCYTES # BLD AUTO: 0.02 10*3/MM3 (ref 0–0.05)
IMM GRANULOCYTES NFR BLD AUTO: 0.4 % (ref 0–0.5)
LDLC SERPL CALC-MCNC: 105 MG/DL (ref 0–100)
LYMPHOCYTES # BLD AUTO: 2.36 10*3/MM3 (ref 0.7–3.1)
LYMPHOCYTES NFR BLD AUTO: 41.7 % (ref 19.6–45.3)
MCH RBC QN AUTO: 29.1 PG (ref 26.6–33)
MCHC RBC AUTO-ENTMCNC: 33.2 G/DL (ref 31.5–35.7)
MCV RBC AUTO: 87.4 FL (ref 79–97)
MONOCYTES # BLD AUTO: 0.41 10*3/MM3 (ref 0.1–0.9)
MONOCYTES NFR BLD AUTO: 7.2 % (ref 5–12)
NEUTROPHILS # BLD AUTO: 2.66 10*3/MM3 (ref 1.7–7)
NEUTROPHILS NFR BLD AUTO: 47 % (ref 42.7–76)
NRBC BLD AUTO-RTO: 0 /100 WBC (ref 0–0.2)
PLATELET # BLD AUTO: 286 10*3/MM3 (ref 140–450)
POTASSIUM SERPL-SCNC: 4.1 MMOL/L (ref 3.5–5.2)
PROT SERPL-MCNC: 7.4 G/DL (ref 6–8.5)
RBC # BLD AUTO: 4.44 10*6/MM3 (ref 3.77–5.28)
SODIUM SERPL-SCNC: 143 MMOL/L (ref 136–145)
T4 FREE SERPL-MCNC: 0.92 NG/DL (ref 0.93–1.7)
TRIGL SERPL-MCNC: 143 MG/DL (ref 0–150)
TSH SERPL DL<=0.005 MIU/L-ACNC: 2.92 UIU/ML (ref 0.27–4.2)
VLDLC SERPL CALC-MCNC: 25 MG/DL (ref 5–40)
WBC # BLD AUTO: 5.66 10*3/MM3 (ref 3.4–10.8)

## 2022-07-08 ENCOUNTER — TELEPHONE (OUTPATIENT)
Dept: OBSTETRICS AND GYNECOLOGY | Facility: CLINIC | Age: 52
End: 2022-07-08

## 2022-07-08 LAB — REF LAB TEST METHOD: NORMAL

## 2022-07-08 NOTE — TELEPHONE ENCOUNTER
Reviewed results with patient. Informed likely menopausal but not post menopausal. She was not fasting at the time of her labs, informed lipid panel would not be concerning if she was not fasting. Also discussed T4 was barley low, TSH was normal. Unlikely he would recommend treatment. She VU .

## 2022-07-12 ENCOUNTER — OFFICE VISIT (OUTPATIENT)
Dept: CARDIOLOGY | Facility: CLINIC | Age: 52
End: 2022-07-12

## 2022-07-12 VITALS
DIASTOLIC BLOOD PRESSURE: 66 MMHG | HEIGHT: 68 IN | BODY MASS INDEX: 25.01 KG/M2 | WEIGHT: 165 LBS | OXYGEN SATURATION: 98 % | SYSTOLIC BLOOD PRESSURE: 110 MMHG | HEART RATE: 77 BPM

## 2022-07-12 DIAGNOSIS — R00.2 PALPITATIONS: Primary | ICD-10-CM

## 2022-07-12 PROCEDURE — 99214 OFFICE O/P EST MOD 30 MIN: CPT | Performed by: INTERNAL MEDICINE

## 2022-07-12 NOTE — PROGRESS NOTES
[facility] at [facility]  Office visit  Ximena Sanchez  1970  [phone]    VISIT DATE:  7/12/2022      PCP: Awilda Frances MD  [address] [address]    CC:  Chief Complaint   Patient presents with   • Palpitations       PROBLEM LIST:  1.  Palpitations                        A.  Stress test in [place] approx 7 years ago.  Irregular rhythm at 145 bpm; good recovery.                         B.  Holter monitor in [place]:  Multifocal palpitations; data deficit  2.  Cardiac murmur                        A.  Echocardiogram 2014: EF 56%, no significant valvular abnormalities.    3.  Hypertension  4.  Osteoarthritis in foot and ankle  5.  Uterine fibroids  6.  Surgeries                        A.  Bilateral lumpectomy- benign    Previous cardiac studies and procedures:  February 2020 echo  · Left ventricular systolic function is normal.  · Estimated EF appears to be in the range of 61 - 65%    September 2021 coronary CTA  1. Calcium score datasets detected 0 distinct calcified plaque lesions  placing patient in the no identifiable   calcification category with a calcium score of 0.0.  2. Structurally normal appearing cardiac anatomy without evidence for  valvular calcification. Cardiac size within normal limits without  pericardial effusion or disease.   3. Normal left atrial anatomy with normal left atrial appendage. No  central pulmonary filling defect or thrombus identified.  4. Normal appearing coronary anatomy without evidence for coronary  stenosis, plaque or origin anomaly.  5. No significant abnormalities of the adjacent mediastinum or lungs and  widened lung windows.    ASSESSMENT:   Diagnosis Plan   1. Palpitations         PLAN:  Palpitations: Continue bisoprolol.  Continue regular exercise which really appears to help moderate symptoms.  Ongoing medical therapy to help alleviate symptoms related to menopause should be tolerated well from a cardiac standpoint and I do not  "have any concerns with her taking black cohosh, soy, estrogen cream or hot flash pills.    Syncope: Suspect vasovagal etiology.  Currently asymptomatic.  Transthoracic echo revealed normal myocardial structure and function.  Will arrange for ambulatory ECG monitor if she has recurrent episodes of lightheadedness or increase in palpitations.    Daytime fatigue and somnolence: No clinical concern for sleep disorder at this time. Potentially consistent with underlying mild narcolepsy.  Has responded well to as needed modafinil 100 mg p.o. daily as needed.    Chest pain, noncardiac: No recurrence.  Has undergone a low risk evaluation with normal coronary CTA which was also remarkable for 0 coronary calcification.    Subjective  Currently being treated for symptoms related to menopause.  Blood pressures been running less than 130/80 mmHg.  Palpitations been well controlled.  She is asking for be okay to take black cohosh, soy, estrogen cream, and potential hot flash pills from a cardiac standpoint.    PHYSICAL EXAMINATION:  Vitals:    07/12/22 1543   BP: 110/66   BP Location: Left arm   Patient Position: Sitting   Pulse: 77   SpO2: 98%   Weight: 74.8 kg (165 lb)   Height: 172.7 cm (68\")     General Appearance:    Alert, cooperative, no distress, appears stated age   Head:    Normocephalic, without obvious abnormality, atraumatic   Eyes:    conjunctiva/corneas clear   Nose:   Nares normal, septum midline, mucosa normal, no drainage   Throat:   Lips, teeth and gums normal   Neck:   Supple, symmetrical, trachea midline, no carotid    bruit or JVD   Lungs:     Clear to auscultation bilaterally, respirations unlabored   Chest Wall:    No tenderness or deformity    Heart:    Regular rate and rhythm, S1 and S2 normal, no murmur, rub   or gallop, normal carotid impulse bilaterally without bruit.   Abdomen:     Soft, non-tender   Extremities:   Extremities normal, atraumatic, no cyanosis or edema   Pulses:   2+ and symmetric all " extremities   Skin:   Skin color, texture, turgor normal, no rashes or lesions       Diagnostic Data:  Procedures  Lab Results   Component Value Date    CHLPL 192 07/05/2022    TRIG 143 07/05/2022    HDL 62 (H) 07/05/2022     Lab Results   Component Value Date    GLUCOSE 88 07/05/2022    BUN 15 07/05/2022    CREATININE 0.72 07/05/2022     07/05/2022    K 4.1 07/05/2022     07/05/2022    CO2 25.0 07/05/2022     Lab Results   Component Value Date    HGBA1C 5.40 07/05/2022     Lab Results   Component Value Date    WBC 5.66 07/05/2022    HGB 12.9 07/05/2022    HCT 38.8 07/05/2022     07/05/2022       Allergies  Allergies   Allergen Reactions   • Acetaminophen Other (See Comments)     Flu like symptoms     • Amoxicillin Hives   • Codeine GI Intolerance   • Prednisone Other (See Comments)     Low doses tolerated; tremors with higher doses       Current Medications    Current Outpatient Medications:   •  bisoprolol (ZEBeta) 5 MG tablet, TAKE 1 TABLET BY MOUTH DAILY, Disp: 30 tablet, Rfl: 3  •  CloNIDine (CATAPRES) 0.1 MG tablet, Take 1 tablet by mouth Every 12 (Twelve) Hours As Needed for High Blood Pressure (>170/100)., Disp: 30 tablet, Rfl: 3  •  estradiol (ESTRACE VAGINAL) 0.1 MG/GM vaginal cream, 2 gms nightly vaginally for 2 weeks then 1 gm three times a week ,, Disp: 42.5 g, Rfl: 12  •  fluconazole (Diflucan) 150 MG tablet, 1 po q week x 4 weeks, Disp: 4 tablet, Rfl: 2  •  modafinil (PROVIGIL) 100 MG tablet, Take 1 tablet by mouth Daily As Needed (somnolence)., Disp: 30 tablet, Rfl: 5  •  ProAir  (90 Base) MCG/ACT inhaler, Inhale 2 puffs Every 4 (Four) Hours As Needed for Wheezing., Disp: 8.5 g, Rfl: 1          ROS  Review of Systems   Cardiovascular: Positive for palpitations. Negative for chest pain and dyspnea on exertion.   Respiratory: Positive for cough and wheezing. Negative for shortness of breath.          SOCIAL HX  Social History     Socioeconomic History   • Marital status:     Tobacco Use   • Smoking status: Never Smoker   • Smokeless tobacco: Never Used   Vaping Use   • Vaping Use: Never used   Substance and Sexual Activity   • Alcohol use: Yes     Comment: occasional   • Drug use: Never   • Sexual activity: Yes     Partners: Male     Birth control/protection: Surgical       FAMILY HX  Family History   Problem Relation Age of Onset   • Arrhythmia Mother    • Atrial fibrillation Mother    • Hypertension Mother    • Thyroid disease Mother    • Fainting Mother    • Heart disease Mother    • No Known Problems Father    • Arrhythmia Maternal Grandmother    • Atrial fibrillation Maternal Grandmother    • Hypertension Maternal Grandmother    • Stroke Maternal Grandmother    • Thyroid disease Maternal Grandmother    • Heart disease Maternal Grandmother    • Ovarian cancer Neg Hx    • Breast cancer Neg Hx    • Colon cancer Neg Hx    • Uterine cancer Neg Hx              Andre Smyth III, MD, FACC

## 2022-07-26 ENCOUNTER — HOSPITAL ENCOUNTER (OUTPATIENT)
Dept: MAMMOGRAPHY | Facility: HOSPITAL | Age: 52
Discharge: HOME | End: 2022-07-26
Admitting: OBSTETRICS & GYNECOLOGY

## 2022-07-26 DIAGNOSIS — Z12.31 VISIT FOR SCREENING MAMMOGRAM: ICD-10-CM

## 2022-07-26 PROCEDURE — 77067 SCR MAMMO BI INCL CAD: CPT

## 2022-07-26 PROCEDURE — 77063 BREAST TOMOSYNTHESIS BI: CPT | Performed by: RADIOLOGY

## 2022-07-26 PROCEDURE — 77063 BREAST TOMOSYNTHESIS BI: CPT

## 2022-07-26 PROCEDURE — 77067 SCR MAMMO BI INCL CAD: CPT | Performed by: RADIOLOGY

## 2022-10-13 RX ORDER — BISOPROLOL FUMARATE 5 MG/1
5 TABLET, FILM COATED ORAL DAILY
Qty: 30 TABLET | Refills: 3 | Status: SHIPPED | OUTPATIENT
Start: 2022-10-13 | End: 2023-02-10

## 2022-12-12 DIAGNOSIS — R40.0 SOMNOLENCE: ICD-10-CM

## 2022-12-13 RX ORDER — MODAFINIL 100 MG/1
TABLET ORAL
Qty: 30 TABLET | Refills: 5 | Status: SHIPPED | OUTPATIENT
Start: 2022-12-13

## 2023-01-03 ENCOUNTER — TELEPHONE (OUTPATIENT)
Dept: CARDIOLOGY | Facility: CLINIC | Age: 53
End: 2023-01-03
Payer: COMMERCIAL

## 2023-01-03 DIAGNOSIS — R09.89 SUSPECTED DEEP VEIN THROMBOSIS (DVT): Primary | ICD-10-CM

## 2023-01-03 NOTE — TELEPHONE ENCOUNTER
For the last few months her Rt leg/foot has clusters of veins bulging.They are back and blue.Her other leg is fine. She was thinking maybe she needed to go to her PCP to be checked. Thinks she needs a vein specialist. Please advise.

## 2023-01-11 ENCOUNTER — HOSPITAL ENCOUNTER (OUTPATIENT)
Dept: CARDIOLOGY | Facility: HOSPITAL | Age: 53
Discharge: HOME | End: 2023-01-11
Admitting: INTERNAL MEDICINE
Payer: COMMERCIAL

## 2023-01-11 VITALS — WEIGHT: 165 LBS | HEIGHT: 68 IN | BODY MASS INDEX: 25.01 KG/M2

## 2023-01-11 DIAGNOSIS — R09.89 SUSPECTED DEEP VEIN THROMBOSIS (DVT): ICD-10-CM

## 2023-01-11 LAB
BH CV LOWER VASCULAR LEFT COMMON FEMORAL AUGMENT: NORMAL
BH CV LOWER VASCULAR LEFT COMMON FEMORAL COMPRESS: NORMAL
BH CV LOWER VASCULAR LEFT COMMON FEMORAL PHASIC: NORMAL
BH CV LOWER VASCULAR LEFT COMMON FEMORAL SPONT: NORMAL
BH CV LOWER VASCULAR RIGHT COMMON FEMORAL AUGMENT: NORMAL
BH CV LOWER VASCULAR RIGHT COMMON FEMORAL COMPRESS: NORMAL
BH CV LOWER VASCULAR RIGHT COMMON FEMORAL PHASIC: NORMAL
BH CV LOWER VASCULAR RIGHT COMMON FEMORAL SPONT: NORMAL
BH CV LOWER VASCULAR RIGHT DISTAL FEMORAL AUGMENT: NORMAL
BH CV LOWER VASCULAR RIGHT DISTAL FEMORAL COMPRESS: NORMAL
BH CV LOWER VASCULAR RIGHT DISTAL FEMORAL PHASIC: NORMAL
BH CV LOWER VASCULAR RIGHT DISTAL FEMORAL SPONT: NORMAL
BH CV LOWER VASCULAR RIGHT GASTRONEMIUS COMPRESS: NORMAL
BH CV LOWER VASCULAR RIGHT GREATER SAPH AK COMPRESS: NORMAL
BH CV LOWER VASCULAR RIGHT GREATER SAPH BK COMPRESS: NORMAL
BH CV LOWER VASCULAR RIGHT LESSER SAPH COMPRESS: NORMAL
BH CV LOWER VASCULAR RIGHT MID FEMORAL AUGMENT: NORMAL
BH CV LOWER VASCULAR RIGHT MID FEMORAL COMPRESS: NORMAL
BH CV LOWER VASCULAR RIGHT MID FEMORAL PHASIC: NORMAL
BH CV LOWER VASCULAR RIGHT MID FEMORAL SPONT: NORMAL
BH CV LOWER VASCULAR RIGHT PERONEAL COMPRESS: NORMAL
BH CV LOWER VASCULAR RIGHT POPLITEAL AUGMENT: NORMAL
BH CV LOWER VASCULAR RIGHT POPLITEAL COMPRESS: NORMAL
BH CV LOWER VASCULAR RIGHT POPLITEAL PHASIC: NORMAL
BH CV LOWER VASCULAR RIGHT POPLITEAL SPONT: NORMAL
BH CV LOWER VASCULAR RIGHT POSTERIOR TIBIAL COMPRESS: NORMAL
BH CV LOWER VASCULAR RIGHT PROFUNDA FEMORAL AUGMENT: NORMAL
BH CV LOWER VASCULAR RIGHT PROFUNDA FEMORAL PHASIC: NORMAL
BH CV LOWER VASCULAR RIGHT PROFUNDA FEMORAL SPONT: NORMAL
BH CV LOWER VASCULAR RIGHT PROXIMAL FEMORAL AUGMENT: NORMAL
BH CV LOWER VASCULAR RIGHT PROXIMAL FEMORAL COMPRESS: NORMAL
BH CV LOWER VASCULAR RIGHT PROXIMAL FEMORAL PHASIC: NORMAL
BH CV LOWER VASCULAR RIGHT PROXIMAL FEMORAL SPONT: NORMAL
BH CV LOWER VASCULAR RIGHT SAPHENOFEMORAL JUNCTION COMPRESS: NORMAL
BH CV LOWER VASCULAR RIGHT SAPHENOFEMORAL JUNCTION PHASIC: NORMAL
BH CV LOWER VASCULAR RIGHT SAPHENOFEMORAL JUNCTION SPONT: NORMAL
MAXIMAL PREDICTED HEART RATE: 166 BPM
STRESS TARGET HR: 141 BPM

## 2023-01-11 PROCEDURE — 93971 EXTREMITY STUDY: CPT | Performed by: INTERNAL MEDICINE

## 2023-01-11 PROCEDURE — 93971 EXTREMITY STUDY: CPT

## 2023-01-12 ENCOUNTER — TELEPHONE (OUTPATIENT)
Dept: CARDIOLOGY | Facility: CLINIC | Age: 53
End: 2023-01-12
Payer: COMMERCIAL

## 2023-01-12 NOTE — TELEPHONE ENCOUNTER
----- Message from Andre Smyth III, MD sent at 1/12/2023  2:50 PM EST -----  Normal venous ultrasound of the right lower extremity.   Benzoyl Peroxide Pregnancy And Lactation Text: This medication is Pregnancy Category C. It is unknown if benzoyl peroxide is excreted in breast milk.

## 2023-01-24 ENCOUNTER — LAB (OUTPATIENT)
Dept: LAB | Facility: HOSPITAL | Age: 53
End: 2023-01-24
Payer: COMMERCIAL

## 2023-01-24 ENCOUNTER — OFFICE VISIT (OUTPATIENT)
Dept: CARDIOLOGY | Facility: CLINIC | Age: 53
End: 2023-01-24
Payer: COMMERCIAL

## 2023-01-24 VITALS
SYSTOLIC BLOOD PRESSURE: 118 MMHG | HEART RATE: 81 BPM | OXYGEN SATURATION: 97 % | BODY MASS INDEX: 25.61 KG/M2 | HEIGHT: 68 IN | DIASTOLIC BLOOD PRESSURE: 76 MMHG | WEIGHT: 169 LBS

## 2023-01-24 DIAGNOSIS — R00.2 PALPITATIONS: ICD-10-CM

## 2023-01-24 DIAGNOSIS — R00.2 PALPITATIONS: Primary | ICD-10-CM

## 2023-01-24 LAB
BASOPHILS # BLD AUTO: 0.03 10*3/MM3 (ref 0–0.2)
BASOPHILS NFR BLD AUTO: 0.6 % (ref 0–1.5)
DEPRECATED RDW RBC AUTO: 38.7 FL (ref 37–54)
EOSINOPHIL # BLD AUTO: 0.1 10*3/MM3 (ref 0–0.4)
EOSINOPHIL NFR BLD AUTO: 1.9 % (ref 0.3–6.2)
ERYTHROCYTE [DISTWIDTH] IN BLOOD BY AUTOMATED COUNT: 12.4 % (ref 12.3–15.4)
ERYTHROCYTE [SEDIMENTATION RATE] IN BLOOD: 12 MM/HR (ref 0–30)
HCT VFR BLD AUTO: 37.4 % (ref 34–46.6)
HGB BLD-MCNC: 12.6 G/DL (ref 12–15.9)
IMM GRANULOCYTES # BLD AUTO: 0.01 10*3/MM3 (ref 0–0.05)
IMM GRANULOCYTES NFR BLD AUTO: 0.2 % (ref 0–0.5)
LYMPHOCYTES # BLD AUTO: 2.01 10*3/MM3 (ref 0.7–3.1)
LYMPHOCYTES NFR BLD AUTO: 38.6 % (ref 19.6–45.3)
MCH RBC QN AUTO: 28.9 PG (ref 26.6–33)
MCHC RBC AUTO-ENTMCNC: 33.7 G/DL (ref 31.5–35.7)
MCV RBC AUTO: 85.8 FL (ref 79–97)
MONOCYTES # BLD AUTO: 0.33 10*3/MM3 (ref 0.1–0.9)
MONOCYTES NFR BLD AUTO: 6.3 % (ref 5–12)
NEUTROPHILS NFR BLD AUTO: 2.73 10*3/MM3 (ref 1.7–7)
NEUTROPHILS NFR BLD AUTO: 52.4 % (ref 42.7–76)
NRBC BLD AUTO-RTO: 0 /100 WBC (ref 0–0.2)
PLATELET # BLD AUTO: 279 10*3/MM3 (ref 140–450)
PMV BLD AUTO: 10.6 FL (ref 6–12)
RBC # BLD AUTO: 4.36 10*6/MM3 (ref 3.77–5.28)
WBC NRBC COR # BLD: 5.21 10*3/MM3 (ref 3.4–10.8)

## 2023-01-24 PROCEDURE — 82306 VITAMIN D 25 HYDROXY: CPT | Performed by: INTERNAL MEDICINE

## 2023-01-24 PROCEDURE — 85652 RBC SED RATE AUTOMATED: CPT

## 2023-01-24 PROCEDURE — 85025 COMPLETE CBC W/AUTO DIFF WBC: CPT | Performed by: INTERNAL MEDICINE

## 2023-01-24 PROCEDURE — 84443 ASSAY THYROID STIM HORMONE: CPT | Performed by: INTERNAL MEDICINE

## 2023-01-24 PROCEDURE — 36415 COLL VENOUS BLD VENIPUNCTURE: CPT | Performed by: INTERNAL MEDICINE

## 2023-01-24 PROCEDURE — 99213 OFFICE O/P EST LOW 20 MIN: CPT | Performed by: INTERNAL MEDICINE

## 2023-01-24 RX ORDER — AMLODIPINE BESYLATE 2.5 MG/1
2.5 TABLET ORAL
Qty: 30 TABLET | Refills: 11 | Status: SHIPPED | OUTPATIENT
Start: 2023-01-24

## 2023-01-24 NOTE — PROGRESS NOTES
[facility] at [facility]  Office visit  Ximena Sanchez  1970  [phone]    VISIT DATE:  1/24/2023      PCP: Awilda Frances MD  [address] [address]    CC:  Chief Complaint   Patient presents with   • Palpitations       PROBLEM LIST:  1.  Palpitations                        A.  Stress test in [place] approx 7 years ago.  Irregular rhythm at 145 bpm; good recovery.                         B.  Holter monitor in [place]:  Multifocal palpitations; data deficit  2.  Cardiac murmur                        A.  Echocardiogram 2014: EF 56%, no significant valvular abnormalities.    3.  Hypertension  4.  Osteoarthritis in foot and ankle  5.  Uterine fibroids  6.  Surgeries                        A.  Bilateral lumpectomy- benign    Previous cardiac studies and procedures:  February 2020 echo  · Left ventricular systolic function is normal.  · Estimated EF appears to be in the range of 61 - 65%    September 2021 coronary CTA  1. Calcium score datasets detected 0 distinct calcified plaque lesions  placing patient in the no identifiable   calcification category with a calcium score of 0.0.  2. Structurally normal appearing cardiac anatomy without evidence for  valvular calcification. Cardiac size within normal limits without  pericardial effusion or disease.   3. Normal left atrial anatomy with normal left atrial appendage. No  central pulmonary filling defect or thrombus identified.  4. Normal appearing coronary anatomy without evidence for coronary  stenosis, plaque or origin anomaly.  5. No significant abnormalities of the adjacent mediastinum or lungs and  widened lung windows.    January 2023 right lower extremity venous duplex   Normal right lower extremity venous duplex scan.    ASSESSMENT:   Diagnosis Plan   1. Palpitations            PLAN:  Palpitations: Continue bisoprolol, did not tolerate lower doses.  Continue regular exercise which really appears to help moderate symptoms.       Syncope: Suspect vasovagal etiology.  Currently asymptomatic.  Transthoracic echo revealed normal myocardial structure and function.  Will arrange for ambulatory ECG monitor if she has recurrent episodes of lightheadedness or increase in palpitations.    Daytime fatigue and somnolence: No clinical concern for sleep disorder at this time. Potentially consistent with underlying mild narcolepsy.  Has responded well to as needed modafinil 100 mg p.o. daily as needed.    Chest pain, noncardiac: No recurrence.  Has undergone a low risk evaluation with normal coronary CTA which was also remarkable for 0 coronary calcification.    Spider veins: More prominent in the right foot and right ankle.  Discussed that this is probably a cosmetic issue and offered reassurance.    Raynaud's variant: Trial of low-dose amlodipine, 2.5 mg p.o. nightly.  Otherwise discussed avoiding colder temperatures.  She did have documented pictures in which there did appear to be a small area of erythema associated with the subcutaneous vein which resolved and bruising.  Currently no clinical suspicion for vasculitis however ESR pending.  We will also check CBC and PT/INR.    Subjective  Currently being treated for symptoms related to menopause.  Blood pressures been running less than 130/80 mmHg.  Palpitations been well controlled.  Develops cold and painful hands during the winter months.  If she exercises or increases her activity level this resolves temporarily.  She has noticed a bluish discoloration of her right foot.  She does appear to have intermittent episodes in which an isolated vein will get irritated and red and then resolve is a small bruise.  This appeared to happen intermittently in the palm of her hands.  She denies fevers or chills weight loss.  No night sweats.  She will have episodes in which she has unexplained bruising in her legs.    PHYSICAL EXAMINATION:  Vitals:    01/24/23 1535   BP: 118/76   BP Location: Right arm  "  Patient Position: Sitting   Cuff Size: Adult   Pulse: 81   SpO2: 97%   Weight: 76.7 kg (169 lb)   Height: 172.7 cm (68\")     General Appearance:    Alert, cooperative, no distress, appears stated age   Head:    Normocephalic, without obvious abnormality, atraumatic   Eyes:    conjunctiva/corneas clear   Nose:   Nares normal, septum midline, mucosa normal, no drainage   Throat:   Lips, teeth and gums normal   Neck:   Supple, symmetrical, trachea midline, no carotid    bruit or JVD   Lungs:     Clear to auscultation bilaterally, respirations unlabored   Chest Wall:    No tenderness or deformity    Heart:    Regular rate and rhythm, S1 and S2 normal, no murmur, rub   or gallop, normal carotid impulse bilaterally without bruit.   Abdomen:     Soft, non-tender   Extremities:   Extremities normal, atraumatic, no cyanosis or edema   Pulses:   2+ and symmetric all extremities   Skin:   Skin color, texture, turgor normal, no rashes or lesions       Diagnostic Data:  Procedures  Lab Results   Component Value Date    CHLPL 192 07/05/2022    TRIG 143 07/05/2022    HDL 62 (H) 07/05/2022     Lab Results   Component Value Date    GLUCOSE 88 07/05/2022    BUN 15 07/05/2022    CREATININE 0.72 07/05/2022     07/05/2022    K 4.1 07/05/2022     07/05/2022    CO2 25.0 07/05/2022     Lab Results   Component Value Date    HGBA1C 5.40 07/05/2022     Lab Results   Component Value Date    WBC 5.66 07/05/2022    HGB 12.9 07/05/2022    HCT 38.8 07/05/2022     07/05/2022       Allergies  Allergies   Allergen Reactions   • Acetaminophen Other (See Comments)     Flu like symptoms     • Amoxicillin Hives   • Codeine GI Intolerance   • Prednisone Other (See Comments)     Low doses tolerated; tremors with higher doses       Current Medications    Current Outpatient Medications:   •  bisoprolol (ZEBeta) 5 MG tablet, TAKE 1 TABLET BY MOUTH DAILY, Disp: 30 tablet, Rfl: 3  •  CloNIDine (CATAPRES) 0.1 MG tablet, Take 1 tablet by " mouth Every 12 (Twelve) Hours As Needed for High Blood Pressure (>170/100)., Disp: 30 tablet, Rfl: 3  •  estradiol (ESTRACE VAGINAL) 0.1 MG/GM vaginal cream, 2 gms nightly vaginally for 2 weeks then 1 gm three times a week ,, Disp: 42.5 g, Rfl: 12  •  fluconazole (Diflucan) 150 MG tablet, 1 po q week x 4 weeks, Disp: 4 tablet, Rfl: 2  •  modafinil (PROVIGIL) 100 MG tablet, TAKE ONE TABLET BY MOUTH DAILY AS NEEDED (SOMNOLENCE), Disp: 30 tablet, Rfl: 5  •  ProAir  (90 Base) MCG/ACT inhaler, Inhale 2 puffs Every 4 (Four) Hours As Needed for Wheezing., Disp: 8.5 g, Rfl: 1          ROS  Review of Systems   Cardiovascular: Positive for palpitations. Negative for chest pain and dyspnea on exertion.   Respiratory: Positive for cough and wheezing. Negative for shortness of breath.          SOCIAL HX  Social History     Socioeconomic History   • Marital status:    Tobacco Use   • Smoking status: Never   • Smokeless tobacco: Never   Vaping Use   • Vaping Use: Never used   Substance and Sexual Activity   • Alcohol use: Yes     Comment: occasional   • Drug use: Never   • Sexual activity: Yes     Partners: Male     Birth control/protection: Surgical       FAMILY HX  Family History   Problem Relation Age of Onset   • Arrhythmia Mother    • Atrial fibrillation Mother    • Hypertension Mother    • Thyroid disease Mother    • Fainting Mother    • Heart disease Mother    • No Known Problems Father    • Arrhythmia Maternal Grandmother    • Atrial fibrillation Maternal Grandmother    • Hypertension Maternal Grandmother    • Stroke Maternal Grandmother    • Thyroid disease Maternal Grandmother    • Heart disease Maternal Grandmother    • Ovarian cancer Neg Hx    • Breast cancer Neg Hx    • Colon cancer Neg Hx    • Uterine cancer Neg Hx              Andre Smyth III, MD, [facility]

## 2023-01-25 LAB
25(OH)D3 SERPL-MCNC: 39.3 NG/ML (ref 30–100)
TSH SERPL DL<=0.05 MIU/L-ACNC: 4.3 UIU/ML (ref 0.27–4.2)

## 2023-01-26 ENCOUNTER — TELEPHONE (OUTPATIENT)
Dept: CARDIOLOGY | Facility: CLINIC | Age: 53
End: 2023-01-26
Payer: COMMERCIAL

## 2023-02-10 RX ORDER — BISOPROLOL FUMARATE 5 MG/1
5 TABLET, FILM COATED ORAL DAILY
Qty: 30 TABLET | Refills: 6 | Status: SHIPPED | OUTPATIENT
Start: 2023-02-10

## 2023-05-23 ENCOUNTER — OFFICE VISIT (OUTPATIENT)
Dept: CARDIOLOGY | Facility: CLINIC | Age: 53
End: 2023-05-23
Payer: COMMERCIAL

## 2023-05-23 VITALS
DIASTOLIC BLOOD PRESSURE: 72 MMHG | BODY MASS INDEX: 25.16 KG/M2 | SYSTOLIC BLOOD PRESSURE: 112 MMHG | HEART RATE: 59 BPM | OXYGEN SATURATION: 100 % | HEIGHT: 68 IN | WEIGHT: 166 LBS

## 2023-05-23 DIAGNOSIS — R40.0 SOMNOLENCE: ICD-10-CM

## 2023-05-23 DIAGNOSIS — R55 SYNCOPE AND COLLAPSE: ICD-10-CM

## 2023-05-23 DIAGNOSIS — R00.2 PALPITATIONS: Primary | ICD-10-CM

## 2023-05-23 PROCEDURE — 99214 OFFICE O/P EST MOD 30 MIN: CPT | Performed by: INTERNAL MEDICINE

## 2023-05-23 RX ORDER — BISOPROLOL FUMARATE 5 MG/1
5 TABLET, FILM COATED ORAL DAILY
Qty: 30 TABLET | Refills: 11 | Status: SHIPPED | OUTPATIENT
Start: 2023-05-23

## 2023-05-23 NOTE — PROGRESS NOTES
[facility] at [facility]  Office visit  Ximena Sanchez  1970  [phone]    VISIT DATE:  5/23/2023      PCP: Awilda Frances MD  [address] [address]    CC:  Chief Complaint   Patient presents with   • Palpitations       PROBLEM LIST:  1.  Palpitations                        A.  Stress test in [place] approx 7 years ago.  Irregular rhythm at 145 bpm; good recovery.                         B.  Holter monitor in [place]:  Multifocal palpitations; data deficit  2.  Cardiac murmur                        A.  Echocardiogram 2014: EF 56%, no significant valvular abnormalities.    3.  Hypertension  4.  Osteoarthritis in foot and ankle  5.  Uterine fibroids  6.  Surgeries                        A.  Bilateral lumpectomy- benign    Previous cardiac studies and procedures:  February 2020 echo  · Left ventricular systolic function is normal.  · Estimated EF appears to be in the range of 61 - 65%    September 2021 coronary CTA  1. Calcium score datasets detected 0 distinct calcified plaque lesions  placing patient in the no identifiable   calcification category with a calcium score of 0.0.  2. Structurally normal appearing cardiac anatomy without evidence for  valvular calcification. Cardiac size within normal limits without  pericardial effusion or disease.   3. Normal left atrial anatomy with normal left atrial appendage. No  central pulmonary filling defect or thrombus identified.  4. Normal appearing coronary anatomy without evidence for coronary  stenosis, plaque or origin anomaly.  5. No significant abnormalities of the adjacent mediastinum or lungs and  widened lung windows.    January 2023 right lower extremity venous duplex   Normal right lower extremity venous duplex scan.    ASSESSMENT:   Diagnosis Plan   1. Palpitations        2. Syncope and collapse            PLAN:  Palpitations: Continue bisoprolol, did not tolerate lower doses.  Continue regular exercise which really  "appears to help moderate symptoms.      Syncope: Suspect vasovagal etiology.  Currently asymptomatic.  Transthoracic echo revealed normal myocardial structure and function.  Will arrange for ambulatory ECG monitor if she has recurrent episodes of lightheadedness or increase in palpitations.    Daytime fatigue and somnolence: No clinical concern for sleep disorder at this time. Potentially consistent with underlying mild narcolepsy.  Has responded well to as needed modafinil 100 mg p.o. daily as needed.    Chest pain, noncardiac: No recurrence.  Has undergone a low risk evaluation with normal coronary CTA which was also remarkable for 0 coronary calcification.    Spider veins: More prominent in the right foot and right ankle.  Discussed that this is probably a cosmetic issue and offered reassurance.    Raynaud's variant: Previously discussed avoiding colder temperatures.  Calcium channel blockade effective however induced constipation.  Will consider alternative pharmacologic agents this winter if symptoms return.    Subjective  Blood pressures been running less than 130/80 mmHg.  Palpitations been well controlled.  Raynaud's symptoms significantly improved on low-dose amlodipine however she developed severe constipation.    PHYSICAL EXAMINATION:  Vitals:    05/23/23 1435   BP: 112/72   BP Location: Right arm   Patient Position: Sitting   Pulse: 59   SpO2: 100%   Weight: 75.3 kg (166 lb)   Height: 172.7 cm (68\")     General Appearance:    Alert, cooperative, no distress, appears stated age   Head:    Normocephalic, without obvious abnormality, atraumatic   Eyes:    conjunctiva/corneas clear   Nose:   Nares normal, septum midline, mucosa normal, no drainage   Throat:   Lips, teeth and gums normal   Neck:   Supple, symmetrical, trachea midline, no carotid    bruit or JVD   Lungs:     Clear to auscultation bilaterally, respirations unlabored   Chest Wall:    No tenderness or deformity    Heart:    Regular rate and " rhythm, S1 and S2 normal, no murmur, rub   or gallop, normal carotid impulse bilaterally without bruit.   Abdomen:     Soft, non-tender   Extremities:   Extremities normal, atraumatic, no cyanosis or edema   Pulses:   2+ and symmetric all extremities   Skin:   Skin color, texture, turgor normal, no rashes or lesions       Diagnostic Data:  Procedures  Lab Results   Component Value Date    CHLPL 192 07/05/2022    TRIG 143 07/05/2022    HDL 62 (H) 07/05/2022     Lab Results   Component Value Date    GLUCOSE 88 07/05/2022    BUN 15 07/05/2022    CREATININE 0.72 07/05/2022     07/05/2022    K 4.1 07/05/2022     07/05/2022    CO2 25.0 07/05/2022     Lab Results   Component Value Date    HGBA1C 5.40 07/05/2022     Lab Results   Component Value Date    WBC 5.21 01/24/2023    HGB 12.6 01/24/2023    HCT 37.4 01/24/2023     01/24/2023       Allergies  Allergies   Allergen Reactions   • Acetaminophen Other (See Comments)     Flu like symptoms     • Amoxicillin Hives   • Codeine GI Intolerance   • Prednisone Other (See Comments)     Low doses tolerated; tremors with higher doses       Current Medications    Current Outpatient Medications:   •  bisoprolol (ZEBeta) 5 MG tablet, TAKE 1 TABLET BY MOUTH DAILY, Disp: 30 tablet, Rfl: 6  •  CloNIDine (CATAPRES) 0.1 MG tablet, Take 1 tablet by mouth Every 12 (Twelve) Hours As Needed for High Blood Pressure (>170/100)., Disp: 30 tablet, Rfl: 3  •  estradiol (ESTRACE VAGINAL) 0.1 MG/GM vaginal cream, 2 gms nightly vaginally for 2 weeks then 1 gm three times a week ,, Disp: 42.5 g, Rfl: 12  •  fluconazole (Diflucan) 150 MG tablet, 1 po q week x 4 weeks, Disp: 4 tablet, Rfl: 2  •  modafinil (PROVIGIL) 100 MG tablet, TAKE ONE TABLET BY MOUTH DAILY AS NEEDED (SOMNOLENCE), Disp: 30 tablet, Rfl: 5  •  ProAir  (90 Base) MCG/ACT inhaler, Inhale 2 puffs Every 4 (Four) Hours As Needed for Wheezing., Disp: 8.5 g, Rfl: 1          ROS  Review of Systems   Cardiovascular:  Positive for palpitations. Negative for chest pain and dyspnea on exertion.   Respiratory: Positive for cough and wheezing. Negative for shortness of breath.          SOCIAL HX  Social History     Socioeconomic History   • Marital status:    Tobacco Use   • Smoking status: Never   • Smokeless tobacco: Never   Vaping Use   • Vaping Use: Never used   Substance and Sexual Activity   • Alcohol use: Yes     Comment: occasional   • Drug use: Never   • Sexual activity: Yes     Partners: Male     Birth control/protection: Surgical       FAMILY HX  Family History   Problem Relation Age of Onset   • Arrhythmia Mother    • Atrial fibrillation Mother    • Hypertension Mother    • Thyroid disease Mother    • Fainting Mother    • Heart disease Mother    • No Known Problems Father    • Arrhythmia Maternal Grandmother    • Atrial fibrillation Maternal Grandmother    • Hypertension Maternal Grandmother    • Stroke Maternal Grandmother    • Thyroid disease Maternal Grandmother    • Heart disease Maternal Grandmother    • Ovarian cancer Neg Hx    • Breast cancer Neg Hx    • Colon cancer Neg Hx    • Uterine cancer Neg Hx              Andre Smyth III, MD, FACC

## 2023-11-30 ENCOUNTER — OFFICE VISIT (OUTPATIENT)
Dept: OBSTETRICS AND GYNECOLOGY | Facility: CLINIC | Age: 53
End: 2023-11-30
Payer: COMMERCIAL

## 2023-11-30 VITALS — WEIGHT: 173 LBS | DIASTOLIC BLOOD PRESSURE: 74 MMHG | SYSTOLIC BLOOD PRESSURE: 112 MMHG | BODY MASS INDEX: 26.3 KG/M2

## 2023-11-30 DIAGNOSIS — N95.1 MENOPAUSAL SYMPTOMS: ICD-10-CM

## 2023-11-30 DIAGNOSIS — R11.0 NAUSEA WITHOUT VOMITING: ICD-10-CM

## 2023-11-30 DIAGNOSIS — Z01.419 PAP TEST, AS PART OF ROUTINE GYNECOLOGICAL EXAMINATION: Primary | ICD-10-CM

## 2023-11-30 DIAGNOSIS — L72.3 SEBACEOUS CYST: ICD-10-CM

## 2023-11-30 PROCEDURE — 99396 PREV VISIT EST AGE 40-64: CPT | Performed by: OBSTETRICS & GYNECOLOGY

## 2023-11-30 RX ORDER — CEPHALEXIN 500 MG/1
500 CAPSULE ORAL 4 TIMES DAILY
Qty: 28 CAPSULE | Refills: 2 | Status: SHIPPED | OUTPATIENT
Start: 2023-11-30 | End: 2023-12-07

## 2023-11-30 NOTE — PROGRESS NOTES
Gynecologic Annual Exam Note        GYN Annual Exam     CC - Here for annual exam.        HPI  Ximena Sanchez is a 53 y.o. female, , who presents for annual well woman exam as a established patient.  She is s/p Ablation. Denies vaginal bleeding.  Patient reports problems with:  nausea, vaginal lump, razor bumps more often than before, occasional swollen lymph nodes under arms and near breast . There were no changes to her medical or surgical history since her last visit.. Partner Status: Marital Status: .  She is is sexually active. She has not had new partners.. STD testing recommendations have been explained to the patient and she does not desire STD testing.    Additional OB/GYN History   On HRT? No    Last Pap : 22. Results: negative. HPV: not done.   Last Completed Pap Smear       This patient has no relevant Health Maintenance data.          History of abnormal Pap smear: yes - repeat neg  Family history of uterine, colon, breast, or ovarian cancer: no  Performs monthly Self-Breast Exam: no  Last mammogram: 22. Done at .    Last Completed Mammogram       This patient has no relevant Health Maintenance data.          Last colonoscopy: has had a colonoscopy 20 year(s) ago. fissures    Last Completed Colonoscopy       This patient has no relevant Health Maintenance data.              Last bone density scan (DEXA): None  Exercises Regularly: yes  Feelings of Anxiety or Depression: no      Tobacco Usage?: No       Current Outpatient Medications:     bisoprolol (ZEBeta) 5 MG tablet, Take 1 tablet by mouth Daily., Disp: 30 tablet, Rfl: 11    CloNIDine (CATAPRES) 0.1 MG tablet, Take 1 tablet by mouth Every 12 (Twelve) Hours As Needed for High Blood Pressure (>170/100)., Disp: 30 tablet, Rfl: 3    fluconazole (Diflucan) 150 MG tablet, 1 po q week x 4 weeks, Disp: 4 tablet, Rfl: 2    modafinil (PROVIGIL) 100 MG tablet, TAKE ONE TABLET BY MOUTH DAILY AS NEEDED (SOMNOLENCE), Disp: 30 tablet,  Rfl: 5    ProAir  (90 Base) MCG/ACT inhaler, Inhale 2 puffs Every 4 (Four) Hours As Needed for Wheezing., Disp: 8.5 g, Rfl: 1    cephalexin (Keflex) 500 MG capsule, Take 1 capsule by mouth 4 (Four) Times a Day for 7 days., Disp: 28 capsule, Rfl: 2    estradiol (ESTRACE VAGINAL) 0.1 MG/GM vaginal cream, 2 gms nightly vaginally for 2 weeks then 1 gm three times a week , (Patient not taking: Reported on 2023), Disp: 42.5 g, Rfl: 12    Patient denies the need for medication refills today.    OB History          2    Para   1    Term   1            AB   1    Living   1         SAB   1    IAB        Ectopic        Molar        Multiple        Live Births   1                Past Medical History:   Diagnosis Date    Arrhythmia     Asthma     Coronary artery disease     Tachycardia, Irregular beats    Essential hypertension 2017    History of abnormal cervical Papanicolaou smear     History of abnormal uterine bleeding     History of bone density study 2019    totals normal    History of tachycardia     Menopausal symptoms     Ovarian fibroma     Uterine fibroid         Past Surgical History:   Procedure Laterality Date    BREAST BIOPSY Bilateral     AGE 13 RARE FORM OF MASTITIS    BREAST SURGERY  age 13    from rare form of mastitis    DILATATION AND CURETTAGE  2017    ENDOMETRIAL ABLATION  2017    for abnormal uterine bleeding    ENDOMETRIAL BIOPSY  2019    for endometrial cells on pap; inconclusive    GYNECOLOGIC CRYOSURGERY  age 15    LAPAROSCOPIC TUBAL LIGATION         Health Maintenance   Topic Date Due    BMI FOLLOWUP  Never done    COLORECTAL CANCER SCREENING  Never done    TDAP/TD VACCINES (1 - Tdap) Never done    HEPATITIS C SCREENING  Never done    ANNUAL PHYSICAL  Never done    ZOSTER VACCINE (1 of 2) Never done    PAP SMEAR  2023    Annual Gynecologic Pelvic and Breast Exam  2023    MAMMOGRAM  2023    INFLUENZA VACCINE  Never done     COVID-19 Vaccine (3 - 2023-24 season) 09/01/2023    Pneumococcal Vaccine 0-64  Aged Out       The additional following portions of the patient's history were reviewed and updated as appropriate: allergies, current medications, past family history, past medical history, past social history, and past surgical history.    Review of Systems    I have reviewed and agree with the HPI, ROS, and historical information as entered above. Mak Altamirano MD      Objective   /74   Wt 78.5 kg (173 lb)   BMI 26.30 kg/m²     Physical Exam  Vitals and nursing note reviewed. Exam conducted with a chaperone present.   Constitutional:       Appearance: She is well-developed.   HENT:      Head: Normocephalic and atraumatic.   Neck:      Thyroid: No thyroid mass or thyromegaly.   Cardiovascular:      Rate and Rhythm: Normal rate and regular rhythm.      Heart sounds: No murmur heard.  Pulmonary:      Effort: Pulmonary effort is normal. No retractions.      Breath sounds: Normal breath sounds. No wheezing, rhonchi or rales.   Chest:      Chest wall: No mass or tenderness.   Breasts:     Right: Normal. No mass, nipple discharge, skin change or tenderness.      Left: Normal. No mass, nipple discharge, skin change or tenderness.   Abdominal:      General: Bowel sounds are normal.      Palpations: Abdomen is soft. Abdomen is not rigid. There is no mass.      Tenderness: There is no abdominal tenderness. There is no guarding.      Hernia: No hernia is present. There is no hernia in the left inguinal area.   Genitourinary:     Labia:         Right: No rash, tenderness or lesion.         Left: No rash, tenderness or lesion.       Vagina: Normal. No vaginal discharge or lesions.      Cervix: No cervical motion tenderness, discharge, lesion or cervical bleeding.      Uterus: Normal. Not enlarged, not fixed and not tender.       Adnexa:         Right: No mass or tenderness.          Left: No mass or tenderness.        Rectum: No external  hemorrhoid.          Comments: 2mall seb cysts thar are not infected  Musculoskeletal:      Cervical back: Normal range of motion. No muscular tenderness.   Neurological:      Mental Status: She is alert and oriented to person, place, and time.   Psychiatric:         Behavior: Behavior normal.            Assessment and Plan    Problem List Items Addressed This Visit    None  Visit Diagnoses       Pap test, as part of routine gynecological examination    -  Primary    Relevant Orders    LIQUID-BASED PAP SMEAR WITH HPV GENOTYPING IF ASCUS ([facility])    Follicle Stimulating Hormone    Estradiol    TSH    CBC & Differential    Comprehensive Metabolic Panel    Hemoglobin A1c    Lipid Panel    Vitamin D,25-Hydroxy    T4, Free    EMILY    Nausea without vomiting        Menopausal symptoms        Sebaceous cyst                GYN annual well woman exam.   Reviewed monthly self breast exams.  Instructed to call with lumps, pain, or breast discharge.  Yearly mammograms ordered.  Ordered mammogram today.  Recommended use of Vitamin D and getting adequate calcium in her diet. (1500mg)  Reviewed exercise as a preventative health measures.   Reviewed BMI and weight loss as preventative health measures.   Colonoscopy recommended.  Symptoms of menopausal transition reviewed with patient.  Reviewed risks/benefits of OTC, non-hormonal and hormonal treatment for vasomotor and other menopausal symptoms.  RTC in 1 year or PRN with problems.  Other: has many sx -joints and nausea and seb cysts   No follow-ups on file.     Mak Altamirano MD  11/30/2023    20

## 2023-12-01 LAB
25(OH)D3+25(OH)D2 SERPL-MCNC: 37.4 NG/ML (ref 30–100)
ALBUMIN SERPL-MCNC: 5 G/DL (ref 3.8–4.9)
ALBUMIN/GLOB SERPL: 1.9 {RATIO} (ref 1.2–2.2)
ALP SERPL-CCNC: 104 IU/L (ref 44–121)
ALT SERPL-CCNC: 22 IU/L (ref 0–32)
ANA SER QL: NEGATIVE
AST SERPL-CCNC: 24 IU/L (ref 0–40)
BASOPHILS # BLD AUTO: 0 X10E3/UL (ref 0–0.2)
BASOPHILS NFR BLD AUTO: 1 %
BILIRUB SERPL-MCNC: 0.3 MG/DL (ref 0–1.2)
BUN SERPL-MCNC: 15 MG/DL (ref 6–24)
BUN/CREAT SERPL: 16 (ref 9–23)
CALCIUM SERPL-MCNC: 10.2 MG/DL (ref 8.7–10.2)
CHLORIDE SERPL-SCNC: 102 MMOL/L (ref 96–106)
CHOLEST SERPL-MCNC: 241 MG/DL (ref 100–199)
CO2 SERPL-SCNC: 21 MMOL/L (ref 20–29)
CREAT SERPL-MCNC: 0.94 MG/DL (ref 0.57–1)
EGFRCR SERPLBLD CKD-EPI 2021: 72 ML/MIN/1.73
EOSINOPHIL # BLD AUTO: 0.1 X10E3/UL (ref 0–0.4)
EOSINOPHIL NFR BLD AUTO: 2 %
ERYTHROCYTE [DISTWIDTH] IN BLOOD BY AUTOMATED COUNT: 13.1 % (ref 11.7–15.4)
GLOBULIN SER CALC-MCNC: 2.6 G/DL (ref 1.5–4.5)
GLUCOSE SERPL-MCNC: 97 MG/DL (ref 70–99)
HBA1C MFR BLD: 5.8 % (ref 4.8–5.6)
HCT VFR BLD AUTO: 42.2 % (ref 34–46.6)
HDLC SERPL-MCNC: 66 MG/DL
HGB BLD-MCNC: 13.8 G/DL (ref 11.1–15.9)
IMM GRANULOCYTES # BLD AUTO: 0 X10E3/UL (ref 0–0.1)
IMM GRANULOCYTES NFR BLD AUTO: 0 %
LDLC SERPL CALC-MCNC: 153 MG/DL (ref 0–99)
LYMPHOCYTES # BLD AUTO: 2.7 X10E3/UL (ref 0.7–3.1)
LYMPHOCYTES NFR BLD AUTO: 37 %
MCH RBC QN AUTO: 28.1 PG (ref 26.6–33)
MCHC RBC AUTO-ENTMCNC: 32.7 G/DL (ref 31.5–35.7)
MCV RBC AUTO: 86 FL (ref 79–97)
MONOCYTES # BLD AUTO: 0.5 X10E3/UL (ref 0.1–0.9)
MONOCYTES NFR BLD AUTO: 7 %
NEUTROPHILS # BLD AUTO: 4 X10E3/UL (ref 1.4–7)
NEUTROPHILS NFR BLD AUTO: 53 %
PLATELET # BLD AUTO: 306 X10E3/UL (ref 150–450)
POTASSIUM SERPL-SCNC: 4 MMOL/L (ref 3.5–5.2)
PROT SERPL-MCNC: 7.6 G/DL (ref 6–8.5)
RBC # BLD AUTO: 4.91 X10E6/UL (ref 3.77–5.28)
SODIUM SERPL-SCNC: 141 MMOL/L (ref 134–144)
T4 FREE SERPL-MCNC: 1.08 NG/DL (ref 0.82–1.77)
TRIGL SERPL-MCNC: 124 MG/DL (ref 0–149)
VLDLC SERPL CALC-MCNC: 22 MG/DL (ref 5–40)
WBC # BLD AUTO: 7.4 X10E3/UL (ref 3.4–10.8)

## 2023-12-05 LAB — REF LAB TEST METHOD: NORMAL

## 2023-12-12 ENCOUNTER — OFFICE VISIT (OUTPATIENT)
Dept: CARDIOLOGY | Facility: CLINIC | Age: 53
End: 2023-12-12
Payer: COMMERCIAL

## 2023-12-12 VITALS
WEIGHT: 173 LBS | HEART RATE: 80 BPM | HEIGHT: 68 IN | DIASTOLIC BLOOD PRESSURE: 70 MMHG | OXYGEN SATURATION: 98 % | BODY MASS INDEX: 26.22 KG/M2 | SYSTOLIC BLOOD PRESSURE: 118 MMHG

## 2023-12-12 DIAGNOSIS — R00.2 PALPITATIONS: Primary | ICD-10-CM

## 2023-12-12 DIAGNOSIS — R55 SYNCOPE AND COLLAPSE: ICD-10-CM

## 2023-12-12 PROCEDURE — 99214 OFFICE O/P EST MOD 30 MIN: CPT | Performed by: INTERNAL MEDICINE

## 2023-12-12 NOTE — PROGRESS NOTES
[facility] at [facility]  Office visit  Ximena Sanchez  1970  [phone]    VISIT DATE:  12/12/2023      PCP: Awilda Frances MD  [id number] [address]    CC:  Chief Complaint   Patient presents with    Follow-up     6 month        PROBLEM LIST:  1.  Palpitations                        A.  Stress test in [place] approx 7 years ago.  Irregular rhythm at 145 bpm; good recovery.                         B.  Holter monitor in [place]:  Multifocal palpitations; data deficit  2.  Cardiac murmur                        A.  Echocardiogram 2014: EF 56%, no significant valvular abnormalities.    3.  Hypertension  4.  Osteoarthritis in foot and ankle  5.  Uterine fibroids  6.  Surgeries                        A.  Bilateral lumpectomy- benign    Previous cardiac studies and procedures:  February 2020 echo  Left ventricular systolic function is normal.  Estimated EF appears to be in the range of 61 - 65%    September 2021 coronary CTA  1. Calcium score datasets detected 0 distinct calcified plaque lesions  placing patient in the no identifiable   calcification category with a calcium score of 0.0.  2. Structurally normal appearing cardiac anatomy without evidence for  valvular calcification. Cardiac size within normal limits without  pericardial effusion or disease.   3. Normal left atrial anatomy with normal left atrial appendage. No  central pulmonary filling defect or thrombus identified.  4. Normal appearing coronary anatomy without evidence for coronary  stenosis, plaque or origin anomaly.  5. No significant abnormalities of the adjacent mediastinum or lungs and  widened lung windows.    January 2023 right lower extremity venous duplex   Normal right lower extremity venous duplex scan.    ASSESSMENT:   Diagnosis Plan   1. Palpitations        2. Syncope and collapse              PLAN:  Palpitations: Continue bisoprolol, did not tolerate lower doses.  Continue regular exercise which  "really appears to help moderate symptoms.      Syncope: Suspect vasovagal etiology.  Currently asymptomatic.  Transthoracic echo revealed normal myocardial structure and function.  Will arrange for ambulatory ECG monitor if she has recurrent episodes of lightheadedness or increase in palpitations.    Daytime fatigue and somnolence: No clinical concern for sleep disorder at this time. Potentially consistent with underlying mild narcolepsy.  Has responded well to as needed modafinil 100 mg p.o. daily as needed.    Chest pain, noncardiac: No recurrence.  Has undergone a low risk evaluation with normal coronary CTA which was also remarkable for 0 coronary calcification.    Spider veins: More prominent in the right foot and right ankle.      Raynaud's variant: Previously discussed avoiding colder temperatures.  Calcium channel blockade effective however induced constipation.  Will consider alternative pharmacologic agents this winter if symptoms return such as sildenafil.    Subjective  Blood pressures been running less than 130/80 mmHg.  Palpitations been well controlled.  Raynaud's symptoms are currently well-controlled.  Describing generalized weakness in her legs with exercise.  Also having issues with calcific joint disease, currently being followed by [facility].    PHYSICAL EXAMINATION:  Vitals:    12/12/23 1411   BP: 118/70   BP Location: Right arm   Patient Position: Sitting   Pulse: 80   SpO2: 98%   Weight: 78.5 kg (173 lb)   Height: 172.7 cm (68\")       General Appearance:    Alert, cooperative, no distress, appears stated age   Head:    Normocephalic, without obvious abnormality, atraumatic   Eyes:    conjunctiva/corneas clear   Nose:   Nares normal, septum midline, mucosa normal, no drainage   Throat:   Lips, teeth and gums normal   Neck:   Supple, symmetrical, trachea midline, no carotid    bruit or JVD   Lungs:     Clear to auscultation bilaterally, respirations unlabored   Chest Wall:    No tenderness or " deformity    Heart:    Regular rate and rhythm, S1 and S2 normal, no murmur, rub   or gallop, normal carotid impulse bilaterally without bruit.   Abdomen:     Soft, non-tender   Extremities:   Extremities normal, atraumatic, no cyanosis.  Trivial 1+ bilateral ankle edema   Pulses:   2+ and symmetric all extremities   Skin:   Skin color, texture, turgor normal, no rashes or lesions       Diagnostic Data:  Procedures  Lab Results   Component Value Date    CHLPL 241 (H) 11/30/2023    TRIG 124 11/30/2023    HDL 66 11/30/2023     Lab Results   Component Value Date    GLUCOSE 97 11/30/2023    BUN 15 11/30/2023    CREATININE 0.94 11/30/2023     11/30/2023    K 4.0 11/30/2023     11/30/2023    CO2 21 11/30/2023     Lab Results   Component Value Date    HGBA1C 5.8 (H) 11/30/2023     Lab Results   Component Value Date    WBC 7.4 11/30/2023    HGB 13.8 11/30/2023    HCT 42.2 11/30/2023     11/30/2023       Allergies  Allergies   Allergen Reactions    Acetaminophen Other (See Comments)     Flu like symptoms      Amoxicillin Hives    Amlodipine Other (See Comments)     constipation    Codeine GI Intolerance    Prednisone Other (See Comments)     Low doses tolerated; tremors with higher doses       Current Medications    Current Outpatient Medications:     bisoprolol (ZEBeta) 5 MG tablet, Take 1 tablet by mouth Daily., Disp: 30 tablet, Rfl: 11    CloNIDine (CATAPRES) 0.1 MG tablet, Take 1 tablet by mouth Every 12 (Twelve) Hours As Needed for High Blood Pressure (>170/100)., Disp: 30 tablet, Rfl: 3    modafinil (PROVIGIL) 100 MG tablet, TAKE ONE TABLET BY MOUTH DAILY AS NEEDED (SOMNOLENCE), Disp: 30 tablet, Rfl: 5    ProAir  (90 Base) MCG/ACT inhaler, Inhale 2 puffs Every 4 (Four) Hours As Needed for Wheezing., Disp: 8.5 g, Rfl: 1          ROS  Review of Systems   Cardiovascular:  Positive for palpitations. Negative for chest pain and dyspnea on exertion.   Respiratory:  Positive for cough and wheezing.  Negative for shortness of breath.          SOCIAL HX  Social History     Socioeconomic History    Marital status:    Tobacco Use    Smoking status: Never    Smokeless tobacco: Never   Vaping Use    Vaping Use: Never used   Substance and Sexual Activity    Alcohol use: Yes     Comment: occasional    Drug use: Never    Sexual activity: Yes     Partners: Male     Birth control/protection: Surgical       FAMILY HX  Family History   Problem Relation Age of Onset    Arrhythmia Mother     Atrial fibrillation Mother     Hypertension Mother     Thyroid disease Mother     Fainting Mother     Heart disease Mother     No Known Problems Father     Arrhythmia Maternal Grandmother     Atrial fibrillation Maternal Grandmother     Hypertension Maternal Grandmother     Stroke Maternal Grandmother     Thyroid disease Maternal Grandmother     Heart disease Maternal Grandmother     Ovarian cancer Neg Hx     Breast cancer Neg Hx     Colon cancer Neg Hx     Uterine cancer Neg Hx              Andre Smyth III, MD, FACC

## 2024-01-09 ENCOUNTER — TELEPHONE (OUTPATIENT)
Dept: CARDIOLOGY | Facility: CLINIC | Age: 54
End: 2024-01-09
Payer: COMMERCIAL

## 2024-01-09 RX ORDER — DOXAZOSIN 2 MG/1
2 TABLET ORAL NIGHTLY
Qty: 30 TABLET | Refills: 3 | Status: SHIPPED | OUTPATIENT
Start: 2024-01-09

## 2024-01-09 NOTE — TELEPHONE ENCOUNTER
B/P has been elevated for the past 3-4 days and having h/a's. Had the flu a few weeks ago.Today b/p 130/90, last night b/p 140/85. Please advise.

## 2024-01-09 NOTE — TELEPHONE ENCOUNTER
Caller: Ximena Sanchez    Relationship to patient: Self    Best call back number: [phone]    Chief complaint: PT TESTED POSITIVE FOR FLU 3 WEEKS AGO AND WAS GIVEN TAMAFLU. SINCE THEN SHE HAS BEEN HAVING HIGH BLOOD PRESSURE AND HEADACHES. AT 9:00 THIS MORNING IT /90. SHE WOULD LIKE TO GET IN FOR AN APPOINTMENT TO ADDRESS THIS.     Type of visit: FOLLOW UP     Requested date: ASAP    If rescheduling, when is the original appointment: NOT ONE    Additional notes:

## 2024-01-10 ENCOUNTER — OFFICE VISIT (OUTPATIENT)
Dept: CARDIOLOGY | Facility: CLINIC | Age: 54
End: 2024-01-10
Payer: COMMERCIAL

## 2024-01-10 VITALS
OXYGEN SATURATION: 98 % | HEART RATE: 90 BPM | WEIGHT: 176.4 LBS | SYSTOLIC BLOOD PRESSURE: 118 MMHG | DIASTOLIC BLOOD PRESSURE: 78 MMHG | HEIGHT: 68 IN | BODY MASS INDEX: 26.73 KG/M2

## 2024-01-10 DIAGNOSIS — R03.0 ELEVATED BP WITHOUT DIAGNOSIS OF HYPERTENSION: ICD-10-CM

## 2024-01-10 DIAGNOSIS — R00.2 PALPITATIONS: Primary | ICD-10-CM

## 2024-01-10 PROCEDURE — 99214 OFFICE O/P EST MOD 30 MIN: CPT | Performed by: INTERNAL MEDICINE

## 2024-01-10 RX ORDER — CLONIDINE HYDROCHLORIDE 0.1 MG/1
0.1 TABLET ORAL EVERY 12 HOURS PRN
Qty: 15 TABLET | Refills: 0 | Status: SHIPPED | OUTPATIENT
Start: 2024-01-10

## 2024-01-10 RX ORDER — ONDANSETRON 4 MG/1
4 TABLET, FILM COATED ORAL EVERY 12 HOURS PRN
COMMUNITY
Start: 2023-12-27

## 2024-01-10 RX ORDER — METHYLPREDNISOLONE 4 MG/1
TABLET ORAL
Qty: 21 TABLET | Refills: 0 | Status: SHIPPED | OUTPATIENT
Start: 2024-01-10

## 2024-01-10 RX ORDER — OSELTAMIVIR PHOSPHATE 75 MG/1
75 CAPSULE ORAL DAILY
COMMUNITY
Start: 2023-12-27 | End: 2024-01-10

## 2024-01-10 RX ORDER — BENZONATATE 100 MG/1
100 CAPSULE ORAL 3 TIMES DAILY PRN
COMMUNITY
Start: 2024-01-09

## 2024-01-10 NOTE — PROGRESS NOTES
[facility] at [facility]  Office visit  Ximena Sanchez  1970  [phone]    VISIT DATE:  1/10/2024      PCP: Awilda Frances MD  [id number] [address]    CC:  Chief Complaint   Patient presents with    Leg Swelling    Headache     Previously, tested positive for flu. Three days ago, headaches started with high bp readings and cough. Yesterday, tested negative for flu and covid.     Cough    Hypertension     145/97 last night.        PROBLEM LIST:  1.  Palpitations                        A.  Stress test in [place] approx 7 years ago.  Irregular rhythm at 145 bpm; good recovery.                         B.  Holter monitor in [place]:  Multifocal palpitations; data deficit  2.  Cardiac murmur                        A.  Echocardiogram 2014: EF 56%, no significant valvular abnormalities.    3.  Hypertension  4.  Osteoarthritis in foot and ankle  5.  Uterine fibroids  6.  Surgeries                        A.  Bilateral lumpectomy- benign    Previous cardiac studies and procedures:  February 2020 echo  Left ventricular systolic function is normal.  Estimated EF appears to be in the range of 61 - 65%    September 2021 coronary CTA  1. Calcium score datasets detected 0 distinct calcified plaque lesions  placing patient in the no identifiable   calcification category with a calcium score of 0.0.  2. Structurally normal appearing cardiac anatomy without evidence for  valvular calcification. Cardiac size within normal limits without  pericardial effusion or disease.   3. Normal left atrial anatomy with normal left atrial appendage. No  central pulmonary filling defect or thrombus identified.  4. Normal appearing coronary anatomy without evidence for coronary  stenosis, plaque or origin anomaly.  5. No significant abnormalities of the adjacent mediastinum or lungs and  widened lung windows.    January 2023 right lower extremity venous duplex   Normal right lower extremity venous duplex  scan.    ASSESSMENT:   Diagnosis Plan   1. Palpitations        2. Elevated BP without diagnosis of hypertension                PLAN:  Postviral tracheal bronchitis: Reasonable to proceed with methylprednisolone taper.  Continue doxazosin.  Restarted appearing clonidine.  Hopefully will be able to gradually taper off doxazosin in 4 to 6 weeks when she has recovered from her acute illness.    Palpitations: Continue bisoprolol, did not tolerate lower doses.  Continue regular exercise which really appears to help moderate symptoms.      Syncope: Suspect vasovagal etiology.  Currently asymptomatic.  Transthoracic echo revealed normal myocardial structure and function.  Will arrange for ambulatory ECG monitor if she has recurrent episodes of lightheadedness or increase in palpitations.    Daytime fatigue and somnolence: No clinical concern for sleep disorder at this time. Potentially consistent with underlying mild narcolepsy.  Has responded well to as needed modafinil 100 mg p.o. daily as needed.    Chest pain, noncardiac: No recurrence.  Has undergone a low risk evaluation with normal coronary CTA which was also remarkable for 0 coronary calcification.    Spider veins: More prominent in the right foot and right ankle.      Raynaud's variant: Previously discussed avoiding colder temperatures.  Calcium channel blockade effective however induced constipation.  Will consider alternative pharmacologic agents this winter if symptoms return such as sildenafil.    Subjective  Ongoing cough with intermittent elevated blood pressures associated headaches following influenza B infection.  Overall consistent with postviral tracheobronchitis.  [facility] physician was hesitant to initiate steroid burst and till blood pressures were more adequately controlled.  Start taking doxazosin last evening, blood pressure since that time of been running less than 120/80 mmHg.    PHYSICAL EXAMINATION:  Vitals:    01/10/24 1320   BP: 118/78   BP  "Location: Left arm   Patient Position: Sitting   Pulse: 90   SpO2: 98%   Weight: 80 kg (176 lb 6.4 oz)   Height: 172.7 cm (68\")         General Appearance:    Alert, cooperative, no distress, appears stated age   Head:    Normocephalic, without obvious abnormality, atraumatic   Eyes:    conjunctiva/corneas clear   Nose:   Nares normal, septum midline, mucosa normal, no drainage   Throat:   Lips, teeth and gums normal   Neck:   Supple, symmetrical, trachea midline, no carotid    bruit or JVD   Lungs:     Clear to auscultation bilaterally, respirations unlabored   Chest Wall:    No tenderness or deformity    Heart:    Regular rate and rhythm, S1 and S2 normal, no murmur, rub   or gallop, normal carotid impulse bilaterally without bruit.   Abdomen:     Soft, non-tender   Extremities:   Extremities normal, atraumatic, no cyanosis.  Trivial 1+ bilateral ankle edema   Pulses:   2+ and symmetric all extremities   Skin:   Skin color, texture, turgor normal, no rashes or lesions       Diagnostic Data:  Procedures  Lab Results   Component Value Date    CHLPL 241 (H) 11/30/2023    TRIG 124 11/30/2023    HDL 66 11/30/2023     Lab Results   Component Value Date    GLUCOSE 97 11/30/2023    BUN 15 11/30/2023    CREATININE 0.94 11/30/2023     11/30/2023    K 4.0 11/30/2023     11/30/2023    CO2 21 11/30/2023     Lab Results   Component Value Date    HGBA1C 5.8 (H) 11/30/2023     Lab Results   Component Value Date    WBC 7.4 11/30/2023    HGB 13.8 11/30/2023    HCT 42.2 11/30/2023     11/30/2023       Allergies  Allergies   Allergen Reactions    Acetaminophen Other (See Comments)     Flu like symptoms      Amoxicillin Hives    Amlodipine Other (See Comments)     constipation    Codeine GI Intolerance    Prednisone Other (See Comments)     Low doses tolerated; tremors with higher doses       Current Medications    Current Outpatient Medications:     benzonatate (TESSALON) 100 MG capsule, Take 1 capsule by mouth 3 " (Three) Times a Day As Needed for Cough., Disp: , Rfl:     bisoprolol (ZEBeta) 5 MG tablet, Take 1 tablet by mouth Daily., Disp: 30 tablet, Rfl: 11    cloNIDine (Catapres) 0.1 MG tablet, Take 1 tablet by mouth Every 12 (Twelve) Hours As Needed for High Blood Pressure (>160/90 mm Hg.)., Disp: 15 tablet, Rfl: 0    doxazosin (Cardura) 2 MG tablet, Take 1 tablet by mouth Every Night., Disp: 30 tablet, Rfl: 3    Mucinex 600 MG 12 hr tablet, Take 1 tablet by mouth 2 (Two) Times a Day., Disp: , Rfl:     ProAir  (90 Base) MCG/ACT inhaler, Inhale 2 puffs Every 4 (Four) Hours As Needed for Wheezing., Disp: 8.5 g, Rfl: 1    methylPREDNISolone (MEDROL) 4 MG dose pack, Take as directed on package instructions., Disp: 21 tablet, Rfl: 0    modafinil (PROVIGIL) 100 MG tablet, TAKE ONE TABLET BY MOUTH DAILY AS NEEDED (SOMNOLENCE) (Patient not taking: Reported on 1/10/2024), Disp: 30 tablet, Rfl: 5    ondansetron (ZOFRAN) 4 MG tablet, Take 1 tablet by mouth Every 12 (Twelve) Hours As Needed. (Patient not taking: Reported on 1/10/2024), Disp: , Rfl:           ROS  Review of Systems   Cardiovascular:  Positive for palpitations. Negative for chest pain and dyspnea on exertion.   Respiratory:  Positive for cough and wheezing. Negative for shortness of breath.          SOCIAL HX  Social History     Socioeconomic History    Marital status:    Tobacco Use    Smoking status: Never    Smokeless tobacco: Never   Vaping Use    Vaping Use: Never used   Substance and Sexual Activity    Alcohol use: Yes     Comment: occasional    Drug use: Never    Sexual activity: Yes     Partners: Male     Birth control/protection: Surgical       FAMILY HX  Family History   Problem Relation Age of Onset    Arrhythmia Mother     Atrial fibrillation Mother     Hypertension Mother     Thyroid disease Mother     Fainting Mother     Heart disease Mother     No Known Problems Father     Arrhythmia Maternal Grandmother     Atrial fibrillation Maternal  Grandmother     Hypertension Maternal Grandmother     Stroke Maternal Grandmother     Thyroid disease Maternal Grandmother     Heart disease Maternal Grandmother     Ovarian cancer Neg Hx     Breast cancer Neg Hx     Colon cancer Neg Hx     Uterine cancer Neg Hx              Andre Smyth III, MD, FACC

## 2024-02-20 RX ORDER — CLONIDINE HYDROCHLORIDE 0.1 MG/1
TABLET ORAL
Qty: 15 TABLET | Refills: 1 | Status: SHIPPED | OUTPATIENT
Start: 2024-02-20

## 2024-04-16 ENCOUNTER — TELEPHONE (OUTPATIENT)
Dept: CARDIOLOGY | Facility: CLINIC | Age: 54
End: 2024-04-16

## 2024-04-16 NOTE — TELEPHONE ENCOUNTER
Williams lerma04/16/2024 for patient to call back regarding new address for the [place] office.  Patient has an appointment on 04/30/2024 2:45pm with Dr. Smyth.  [place].  Patient called back same day.  Patient was given new address, appointment info and directions.  Patient understands where the new location is for the above appointment.[email or web address].

## 2024-04-30 ENCOUNTER — OFFICE VISIT (OUTPATIENT)
Dept: CARDIOLOGY | Facility: CLINIC | Age: 54
End: 2024-04-30
Payer: COMMERCIAL

## 2024-04-30 VITALS
HEIGHT: 68 IN | WEIGHT: 176 LBS | DIASTOLIC BLOOD PRESSURE: 72 MMHG | BODY MASS INDEX: 26.67 KG/M2 | HEART RATE: 72 BPM | OXYGEN SATURATION: 98 % | SYSTOLIC BLOOD PRESSURE: 112 MMHG

## 2024-04-30 DIAGNOSIS — R60.0 BILATERAL LEG EDEMA: ICD-10-CM

## 2024-04-30 DIAGNOSIS — R00.2 PALPITATIONS: Primary | ICD-10-CM

## 2024-04-30 DIAGNOSIS — R07.2 PRECORDIAL PAIN: ICD-10-CM

## 2024-04-30 PROCEDURE — 99214 OFFICE O/P EST MOD 30 MIN: CPT | Performed by: INTERNAL MEDICINE

## 2024-04-30 RX ORDER — FUROSEMIDE 20 MG/1
20 TABLET ORAL AS NEEDED
Qty: 30 TABLET | Refills: 5 | Status: SHIPPED | OUTPATIENT
Start: 2024-04-30

## 2024-04-30 RX ORDER — POTASSIUM CHLORIDE 1500 MG/1
20 TABLET, EXTENDED RELEASE ORAL AS NEEDED
Qty: 30 TABLET | Refills: 5 | Status: SHIPPED | OUTPATIENT
Start: 2024-04-30

## 2024-04-30 NOTE — PROGRESS NOTES
[facility] at [facility]  Office visit  Ximena Sanchez  1970  [phone]    VISIT DATE:  4/30/2024      PCP: Awilda Frances MD  [address] [address]    CC:  Chief Complaint   Patient presents with    Palpitations     3 month follow up       PROBLEM LIST:  1.  Palpitations                        A.  Stress test in [place] approx 7 years ago.  Irregular rhythm at 145 bpm; good recovery.                         B.  Holter monitor in [place]:  Multifocal palpitations; data deficit  2.  Cardiac murmur                        A.  Echocardiogram 2014: EF 56%, no significant valvular abnormalities.    3.  Hypertension  4.  Osteoarthritis in foot and ankle  5.  Uterine fibroids  6.  Surgeries                        A.  Bilateral lumpectomy- benign    Previous cardiac studies and procedures:  February 2020 echo  Left ventricular systolic function is normal.  Estimated EF appears to be in the range of 61 - 65%    September 2021 coronary CTA  1. Calcium score datasets detected 0 distinct calcified plaque lesions  placing patient in the no identifiable   calcification category with a calcium score of 0.0.  2. Structurally normal appearing cardiac anatomy without evidence for  valvular calcification. Cardiac size within normal limits without  pericardial effusion or disease.   3. Normal left atrial anatomy with normal left atrial appendage. No  central pulmonary filling defect or thrombus identified.  4. Normal appearing coronary anatomy without evidence for coronary  stenosis, plaque or origin anomaly.  5. No significant abnormalities of the adjacent mediastinum or lungs and  widened lung windows.    January 2023 right lower extremity venous duplex   Normal right lower extremity venous duplex scan.    ASSESSMENT:   Diagnosis Plan   1. Palpitations        2. Precordial pain        3. Bilateral leg edema                PLAN:  Palpitations: Continue bisoprolol, did not tolerate lower doses.   "Continue regular exercise which really appears to help moderate symptoms.      Syncope: Suspect vasovagal etiology.  Currently asymptomatic.  Transthoracic echo revealed normal myocardial structure and function.  Will arrange for ambulatory ECG monitor if she has recurrent episodes of lightheadedness or increase in palpitations.    Daytime fatigue and somnolence: No clinical concern for sleep disorder at this time. Potentially consistent with underlying mild narcolepsy.  Has responded well to as needed modafinil 100 mg p.o. daily as needed.    Chest pain, noncardiac: No recurrence.  Has undergone a low risk evaluation with normal coronary CTA which was also remarkable for 0 coronary calcification.    Spider veins: More prominent in the right foot and right ankle.  Previously discussed pathophysiology.  Currently asymptomatic.    Raynaud's variant: Previously discussed avoiding colder temperatures.  Calcium channel blockade effective however induced constipation.  Will consider alternative pharmacologic agents this winter if symptoms return such as sildenafil.    Dependent edema: Discussed limiting sodium intake.  Elevating feet when possible.  Continued exercise.  Use of compression stockings.  Prescription given for as needed Lasix with potassium supplementation.    Subjective  Blood pressures are predominantly running less than 130/80 mmHg.  Intermittent spikes with emotional stress for which he takes.  Clonidine.  Palpitations are well-controlled.  Exercising on a regular basis.  She does notice fairly consistent bilateral lower extremity edema towards the end the day which resolves overnight, occasionally this results in significant swelling.  Denies dyspnea on exertion or PND orthopnea.    PHYSICAL EXAMINATION:  Vitals:    04/30/24 1449   BP: 112/72   BP Location: Right arm   Patient Position: Sitting   Pulse: 72   SpO2: 98%   Weight: 79.8 kg (176 lb)   Height: 172.7 cm (68\")           General Appearance:    " Alert, cooperative, no distress, appears stated age   Head:    Normocephalic, without obvious abnormality, atraumatic   Eyes:    conjunctiva/corneas clear   Nose:   Nares normal, septum midline, mucosa normal, no drainage   Throat:   Lips, teeth and gums normal   Neck:   Supple, symmetrical, trachea midline, no carotid    bruit or JVD   Lungs:     Clear to auscultation bilaterally, respirations unlabored   Chest Wall:    No tenderness or deformity    Heart:    Regular rate and rhythm, S1 and S2 normal, no murmur, rub   or gallop, normal carotid impulse bilaterally without bruit.   Abdomen:     Soft, non-tender   Extremities:   Extremities normal, atraumatic, no cyanosis.  Trivial 1+ bilateral ankle edema   Pulses:   2+ and symmetric all extremities   Skin:   Skin color, texture, turgor normal, no rashes or lesions       Diagnostic Data:  Procedures  Lab Results   Component Value Date    CHLPL 241 (H) 11/30/2023    TRIG 124 11/30/2023    HDL 66 11/30/2023     Lab Results   Component Value Date    GLUCOSE 97 11/30/2023    BUN 15 11/30/2023    CREATININE 0.94 11/30/2023     11/30/2023    K 4.0 11/30/2023     11/30/2023    CO2 21 11/30/2023     Lab Results   Component Value Date    HGBA1C 5.8 (H) 11/30/2023     Lab Results   Component Value Date    WBC 7.4 11/30/2023    HGB 13.8 11/30/2023    HCT 42.2 11/30/2023     11/30/2023       Allergies  Allergies   Allergen Reactions    Acetaminophen Other (See Comments)     Flu like symptoms      Amoxicillin Hives    Amlodipine Other (See Comments)     constipation    Codeine GI Intolerance    Prednisone Other (See Comments)     Low doses tolerated; tremors with higher doses       Current Medications    Current Outpatient Medications:     bisoprolol (ZEBeta) 5 MG tablet, Take 1 tablet by mouth Daily., Disp: 30 tablet, Rfl: 11    cloNIDine (CATAPRES) 0.1 MG tablet, TAKE 1 TABLET EVERY 12 HOURS AS NEEDED FOR HIGH BLOOD PRESSURE>160/90, Disp: 15 tablet, Rfl: 1     modafinil (PROVIGIL) 100 MG tablet, TAKE ONE TABLET BY MOUTH DAILY AS NEEDED (SOMNOLENCE), Disp: 30 tablet, Rfl: 5    Mucinex 600 MG 12 hr tablet, Take 1 tablet by mouth As Needed., Disp: , Rfl:     ondansetron (ZOFRAN) 4 MG tablet, Take 1 tablet by mouth As Needed., Disp: , Rfl:     ProAir  (90 Base) MCG/ACT inhaler, Inhale 2 puffs Every 4 (Four) Hours As Needed for Wheezing., Disp: 8.5 g, Rfl: 1    furosemide (LASIX) 20 MG tablet, Take 1 tablet by mouth As Needed (daily as needed for edema)., Disp: 30 tablet, Rfl: 5    potassium chloride ER (K-TAB) 20 MEQ tablet controlled-release ER tablet, Take 1 tablet by mouth As Needed (take with lasix)., Disp: 30 tablet, Rfl: 5          ROS  Review of Systems   Cardiovascular:  Positive for palpitations. Negative for chest pain and dyspnea on exertion.   Respiratory:  Positive for cough and wheezing. Negative for shortness of breath.          SOCIAL HX  Social History     Socioeconomic History    Marital status:    Tobacco Use    Smoking status: Never    Smokeless tobacco: Never   Vaping Use    Vaping status: Never Used   Substance and Sexual Activity    Alcohol use: Yes     Comment: occasional    Drug use: Never    Sexual activity: Yes     Partners: Male     Birth control/protection: Surgical       FAMILY HX  Family History   Problem Relation Age of Onset    Arrhythmia Mother     Atrial fibrillation Mother     Hypertension Mother     Thyroid disease Mother     Fainting Mother     Heart disease Mother     No Known Problems Father     Arrhythmia Maternal Grandmother     Atrial fibrillation Maternal Grandmother     Hypertension Maternal Grandmother     Stroke Maternal Grandmother     Thyroid disease Maternal Grandmother     Heart disease Maternal Grandmother     Ovarian cancer Neg Hx     Breast cancer Neg Hx     Colon cancer Neg Hx     Uterine cancer Neg Hx              Andre Smyth III, MD, [facility]

## 2024-05-13 ENCOUNTER — TELEPHONE (OUTPATIENT)
Dept: CARDIOLOGY | Facility: CLINIC | Age: 54
End: 2024-05-13
Payer: COMMERCIAL

## 2024-05-13 NOTE — TELEPHONE ENCOUNTER
"Patient called stating she had \"felt weird\" before going to bed last night. Unable to describe weird feeling. She woke up at 2:30am this morning. States she did not feel well, felt weak. States she had got out of bed to go to the bathroom. When she was walking, she had collapsed and \"blacked out\". States her  picked her up and brought her back to bed. She did not remember \"blacking out\", she remembered once she was back in bed. States she had irregular heart beat for several minutes, then went back to normal. States she woke up this morning feeling better. States HR and BP have been normal. States palpitations have been uncontrol. Advised patient to slowly get up from bed, taking a moment before starting to walk. Patient verbalizes understanding. Do you have any other recommendations?  "

## 2024-05-28 RX ORDER — BISOPROLOL FUMARATE 5 MG/1
5 TABLET, FILM COATED ORAL DAILY
Qty: 30 TABLET | Refills: 11 | Status: SHIPPED | OUTPATIENT
Start: 2024-05-28

## 2024-06-27 ENCOUNTER — TELEPHONE (OUTPATIENT)
Dept: CARDIOLOGY | Facility: CLINIC | Age: 54
End: 2024-06-27

## 2024-06-27 NOTE — TELEPHONE ENCOUNTER
The [facility] received a fax that requires your attention. The document has been indexed to the patient’s chart for your review.      Reason for sending: EXTERNAL MEDICAL RECORD NOTIFICATION     Documents Description: CARDIAC CLEARANCE REQ- [facility] -6.27.24     Name of Sender: [facility]     Date Indexed: 6.27.24

## 2024-07-26 ENCOUNTER — TRANSCRIBE ORDERS (OUTPATIENT)
Dept: CARDIOLOGY | Facility: HOSPITAL | Age: 54
End: 2024-07-26
Payer: COMMERCIAL

## 2024-07-26 ENCOUNTER — TRANSCRIBE ORDERS (OUTPATIENT)
Dept: LAB | Facility: HOSPITAL | Age: 54
End: 2024-07-26
Payer: COMMERCIAL

## 2024-07-26 ENCOUNTER — LAB (OUTPATIENT)
Dept: LAB | Facility: HOSPITAL | Age: 54
End: 2024-07-26
Payer: COMMERCIAL

## 2024-07-26 ENCOUNTER — HOSPITAL ENCOUNTER (OUTPATIENT)
Dept: CARDIOLOGY | Facility: HOSPITAL | Age: 54
Discharge: HOME | End: 2024-07-26
Payer: COMMERCIAL

## 2024-07-26 DIAGNOSIS — Z01.818 PRE-OP TESTING: ICD-10-CM

## 2024-07-26 DIAGNOSIS — Z01.818 PRE-OP TESTING: Primary | ICD-10-CM

## 2024-07-26 DIAGNOSIS — Z01.812 PRE-OPERATIVE LABORATORY EXAMINATION: ICD-10-CM

## 2024-07-26 DIAGNOSIS — Z01.812 PRE-OPERATIVE LABORATORY EXAMINATION: Primary | ICD-10-CM

## 2024-07-26 LAB
ALBUMIN SERPL-MCNC: 4.3 G/DL (ref 3.5–5.2)
ALBUMIN/GLOB SERPL: 1.5 G/DL
ALP SERPL-CCNC: 96 U/L (ref 39–117)
ALT SERPL W P-5'-P-CCNC: 18 U/L (ref 1–33)
ANION GAP SERPL CALCULATED.3IONS-SCNC: 10.4 MMOL/L (ref 5–15)
AST SERPL-CCNC: 20 U/L (ref 1–32)
BILIRUB SERPL-MCNC: <0.2 MG/DL (ref 0–1.2)
BUN SERPL-MCNC: 17 MG/DL (ref 6–20)
BUN/CREAT SERPL: 21.8 (ref 7–25)
CALCIUM SPEC-SCNC: 10 MG/DL (ref 8.6–10.5)
CHLORIDE SERPL-SCNC: 106 MMOL/L (ref 98–107)
CO2 SERPL-SCNC: 23.6 MMOL/L (ref 22–29)
CREAT SERPL-MCNC: 0.78 MG/DL (ref 0.57–1)
DEPRECATED RDW RBC AUTO: 37.9 FL (ref 37–54)
EGFRCR SERPLBLD CKD-EPI 2021: 89.8 ML/MIN/1.73
ERYTHROCYTE [DISTWIDTH] IN BLOOD BY AUTOMATED COUNT: 12.5 % (ref 12.3–15.4)
GLOBULIN UR ELPH-MCNC: 2.9 GM/DL
GLUCOSE SERPL-MCNC: 93 MG/DL (ref 65–99)
HCT VFR BLD AUTO: 38.6 % (ref 34–46.6)
HGB BLD-MCNC: 12.9 G/DL (ref 12–15.9)
MCH RBC QN AUTO: 28 PG (ref 26.6–33)
MCHC RBC AUTO-ENTMCNC: 33.4 G/DL (ref 31.5–35.7)
MCV RBC AUTO: 83.9 FL (ref 79–97)
PLATELET # BLD AUTO: 270 10*3/MM3 (ref 140–450)
PMV BLD AUTO: 10 FL (ref 6–12)
POTASSIUM SERPL-SCNC: 3.9 MMOL/L (ref 3.5–5.2)
PROT SERPL-MCNC: 7.2 G/DL (ref 6–8.5)
QT INTERVAL: 410 MS
QTC INTERVAL: 426 MS
RBC # BLD AUTO: 4.6 10*6/MM3 (ref 3.77–5.28)
SODIUM SERPL-SCNC: 140 MMOL/L (ref 136–145)
WBC NRBC COR # BLD AUTO: 6.03 10*3/MM3 (ref 3.4–10.8)

## 2024-07-26 PROCEDURE — 93005 ELECTROCARDIOGRAM TRACING: CPT | Performed by: OTOLARYNGOLOGY

## 2024-07-26 PROCEDURE — 85027 COMPLETE CBC AUTOMATED: CPT

## 2024-07-26 PROCEDURE — 80053 COMPREHEN METABOLIC PANEL: CPT

## 2024-07-26 PROCEDURE — 36415 COLL VENOUS BLD VENIPUNCTURE: CPT

## 2024-11-05 ENCOUNTER — OFFICE VISIT (OUTPATIENT)
Dept: CARDIOLOGY | Facility: CLINIC | Age: 54
End: 2024-11-05
Payer: COMMERCIAL

## 2024-11-05 VITALS
DIASTOLIC BLOOD PRESSURE: 96 MMHG | HEART RATE: 81 BPM | SYSTOLIC BLOOD PRESSURE: 138 MMHG | WEIGHT: 175 LBS | HEIGHT: 68 IN | BODY MASS INDEX: 26.52 KG/M2 | OXYGEN SATURATION: 98 %

## 2024-11-05 DIAGNOSIS — R07.2 PRECORDIAL PAIN: ICD-10-CM

## 2024-11-05 DIAGNOSIS — R55 SYNCOPE AND COLLAPSE: ICD-10-CM

## 2024-11-05 DIAGNOSIS — R00.2 PALPITATIONS: Primary | ICD-10-CM

## 2024-11-05 PROCEDURE — 99214 OFFICE O/P EST MOD 30 MIN: CPT | Performed by: INTERNAL MEDICINE

## 2024-11-05 NOTE — PROGRESS NOTES
[facility] at [facility]  Office visit  Ximena Sanchez  1970  [phone]    VISIT DATE:  11/5/2024      PCP: Awilda Frances MD  [address] [address]    CC:  Chief Complaint   Patient presents with    Palpitations       PROBLEM LIST:  1.  Palpitations                        A.  Stress test in [place] approx 7 years ago.  Irregular rhythm at 145 bpm; good recovery.                         B.  Holter monitor in [place]:  Multifocal palpitations; data deficit  2.  Cardiac murmur                        A.  Echocardiogram 2014: EF 56%, no significant valvular abnormalities.    3.  Hypertension  4.  Osteoarthritis in foot and ankle  5.  Uterine fibroids  6.  Surgeries                        A.  Bilateral lumpectomy- benign    Previous cardiac studies and procedures:  February 2020 echo  Left ventricular systolic function is normal.  Estimated EF appears to be in the range of 61 - 65%    September 2021 coronary CTA  1. Calcium score datasets detected 0 distinct calcified plaque lesions  placing patient in the no identifiable   calcification category with a calcium score of 0.0.  2. Structurally normal appearing cardiac anatomy without evidence for  valvular calcification. Cardiac size within normal limits without  pericardial effusion or disease.   3. Normal left atrial anatomy with normal left atrial appendage. No  central pulmonary filling defect or thrombus identified.  4. Normal appearing coronary anatomy without evidence for coronary  stenosis, plaque or origin anomaly.  5. No significant abnormalities of the adjacent mediastinum or lungs and  widened lung windows.    January 2023 right lower extremity venous duplex   Normal right lower extremity venous duplex scan.    ASSESSMENT:   Diagnosis Plan   1. Palpitations        2. Precordial pain        3. Syncope and collapse                PLAN:  Palpitations: Continue bisoprolol, did not tolerate lower doses.  Continue regular  "exercise which really appears to help moderate symptoms.      Syncope: Episode of nocturnal orthostatic hypotension resulting in transient syncope in May.  No recurrence.  Encouraged avoiding dehydration.  Discussed abortive maneuvers..    Daytime fatigue and somnolence: No clinical concern for sleep disorder at this time. Potentially consistent with underlying mild narcolepsy.  Has responded well to as needed modafinil 100 mg p.o. daily as needed.    Chest pain, noncardiac: No recurrence.  Has undergone a low risk evaluation with normal coronary CTA which was also remarkable for 0 coronary calcification.    Spider veins: More prominent in the right foot and right ankle.  Previously discussed pathophysiology.  Currently asymptomatic.    Raynaud's variant: Previously discussed avoiding colder temperatures.  Calcium channel blockade effective however induced constipation.  Will consider alternative pharmacologic agents this winter if symptoms return such as sildenafil.    Dependent edema: Discussed limiting sodium intake.  Elevating feet when possible.  Continued exercise.  Use of compression stockings.  Prescription given for as needed Lasix with potassium supplementation.    Subjective  Blood pressures are predominantly running less than 130/80 mmHg.   Palpitations are well-controlled.  Exercising on a regular basis.  Discussed episode of nocturnal syncope that occurred in May.  Blood pressure elevated to due to work-related stress, mother [place] abdominal [place].    PHYSICAL EXAMINATION:  Vitals:    11/05/24 1425   BP: 138/96   BP Location: Right arm   Patient Position: Sitting   Cuff Size: Adult   Pulse: 81   SpO2: 98%   Weight: 79.4 kg (175 lb)   Height: 172.7 cm (68\")             General Appearance:    Alert, cooperative, no distress, appears stated age   Head:    Normocephalic, without obvious abnormality, atraumatic   Eyes:    conjunctiva/corneas clear   Nose:   Nares normal, septum " midline, mucosa normal, no drainage   Throat:   Lips, teeth and gums normal   Neck:   Supple, symmetrical, trachea midline, no carotid    bruit or JVD   Lungs:     Clear to auscultation bilaterally, respirations unlabored   Chest Wall:    No tenderness or deformity    Heart:    Regular rate and rhythm, S1 and S2 normal, no murmur, rub   or gallop, normal carotid impulse bilaterally without bruit.   Abdomen:     Soft, non-tender   Extremities:   Extremities normal, atraumatic, no cyanosis.  Trivial 1+ bilateral ankle edema   Pulses:   2+ and symmetric all extremities   Skin:   Skin color, texture, turgor normal, no rashes or lesions       Diagnostic Data:  Procedures  Lab Results   Component Value Date    CHLPL 241 (H) 11/30/2023    TRIG 124 11/30/2023    HDL 66 11/30/2023     Lab Results   Component Value Date    GLUCOSE 93 07/26/2024    BUN 17 07/26/2024    CREATININE 0.78 07/26/2024     07/26/2024    K 3.9 07/26/2024     07/26/2024    CO2 23.6 07/26/2024     Lab Results   Component Value Date    HGBA1C 5.8 (H) 11/30/2023     Lab Results   Component Value Date    WBC 6.03 07/26/2024    HGB 12.9 07/26/2024    HCT 38.6 07/26/2024     07/26/2024       Allergies  Allergies   Allergen Reactions    Acetaminophen Other (See Comments)     Flu like symptoms      Amoxicillin Hives    Amlodipine Other (See Comments)     constipation    Codeine GI Intolerance    Prednisone Other (See Comments)     Low doses tolerated; tremors with higher doses       Current Medications    Current Outpatient Medications:     bisoprolol (ZEBeta) 5 MG tablet, TAKE 1 TABLET BY MOUTH DAILY, Disp: 30 tablet, Rfl: 11    cloNIDine (CATAPRES) 0.1 MG tablet, TAKE 1 TABLET EVERY 12 HOURS AS NEEDED FOR HIGH BLOOD PRESSURE>160/90, Disp: 15 tablet, Rfl: 1    furosemide (LASIX) 20 MG tablet, Take 1 tablet by mouth As Needed (daily as needed for edema)., Disp: 30 tablet, Rfl: 5    modafinil (PROVIGIL) 100 MG tablet, TAKE ONE TABLET BY MOUTH  DAILY AS NEEDED (SOMNOLENCE), Disp: 30 tablet, Rfl: 5    Mucinex 600 MG 12 hr tablet, Take 1 tablet by mouth As Needed., Disp: , Rfl:     ondansetron (ZOFRAN) 4 MG tablet, Take 1 tablet by mouth As Needed., Disp: , Rfl:     potassium chloride ER (K-TAB) 20 MEQ tablet controlled-release ER tablet, Take 1 tablet by mouth As Needed (take with lasix)., Disp: 30 tablet, Rfl: 5    ProAir  (90 Base) MCG/ACT inhaler, Inhale 2 puffs Every 4 (Four) Hours As Needed for Wheezing., Disp: 8.5 g, Rfl: 1          ROS  Review of Systems   Cardiovascular:  Positive for palpitations. Negative for chest pain and dyspnea on exertion.   Respiratory:  Positive for cough and wheezing. Negative for shortness of breath.          SOCIAL HX  Social History     Socioeconomic History    Marital status:    Tobacco Use    Smoking status: Never    Smokeless tobacco: Never   Vaping Use    Vaping status: Never Used   Substance and Sexual Activity    Alcohol use: Yes     Comment: occasional    Drug use: Never    Sexual activity: Yes     Partners: Male     Birth control/protection: Surgical       FAMILY HX  Family History   Problem Relation Age of Onset    Arrhythmia Mother     Atrial fibrillation Mother     Hypertension Mother     Thyroid disease Mother     Fainting Mother     Heart disease Mother     No Known Problems Father     Arrhythmia Maternal Grandmother     Atrial fibrillation Maternal Grandmother     Hypertension Maternal Grandmother     Stroke Maternal Grandmother     Thyroid disease Maternal Grandmother     Heart disease Maternal Grandmother     Ovarian cancer Neg Hx     Breast cancer Neg Hx     Colon cancer Neg Hx     Uterine cancer Neg Hx              Andre Smyth III, MD, FACC

## 2025-01-28 ENCOUNTER — APPOINTMENT (OUTPATIENT)
Facility: HOSPITAL | Age: 55
End: 2025-01-28
Payer: COMMERCIAL

## 2025-01-28 ENCOUNTER — HOSPITAL ENCOUNTER (EMERGENCY)
Facility: HOSPITAL | Age: 55
Discharge: HOME | End: 2025-01-28
Attending: EMERGENCY MEDICINE | Admitting: EMERGENCY MEDICINE
Payer: COMMERCIAL

## 2025-01-28 VITALS
OXYGEN SATURATION: 98 % | DIASTOLIC BLOOD PRESSURE: 91 MMHG | HEIGHT: 69 IN | HEART RATE: 82 BPM | SYSTOLIC BLOOD PRESSURE: 148 MMHG | TEMPERATURE: 98.5 F | BODY MASS INDEX: 26.54 KG/M2 | WEIGHT: 179.2 LBS | RESPIRATION RATE: 18 BRPM

## 2025-01-28 DIAGNOSIS — R10.9 ABDOMINAL PAIN, UNSPECIFIED ABDOMINAL LOCATION: Primary | ICD-10-CM

## 2025-01-28 LAB
ALBUMIN SERPL-MCNC: 4.3 G/DL (ref 3.5–5.2)
ALBUMIN/GLOB SERPL: 1.5 G/DL
ALP SERPL-CCNC: 89 U/L (ref 39–117)
ALT SERPL W P-5'-P-CCNC: 16 U/L (ref 1–33)
ANION GAP SERPL CALCULATED.3IONS-SCNC: 13.9 MMOL/L (ref 5–15)
AST SERPL-CCNC: 23 U/L (ref 1–32)
BASOPHILS # BLD AUTO: 0.03 10*3/MM3 (ref 0–0.2)
BASOPHILS NFR BLD AUTO: 0.3 % (ref 0–1.5)
BILIRUB SERPL-MCNC: 0.2 MG/DL (ref 0–1.2)
BILIRUB UR QL STRIP: NEGATIVE
BUN SERPL-MCNC: 12 MG/DL (ref 6–20)
BUN/CREAT SERPL: 12 (ref 7–25)
CALCIUM SPEC-SCNC: 10 MG/DL (ref 8.6–10.5)
CHLORIDE SERPL-SCNC: 103 MMOL/L (ref 98–107)
CLARITY UR: CLEAR
CO2 SERPL-SCNC: 23.1 MMOL/L (ref 22–29)
COLOR UR: YELLOW
CREAT SERPL-MCNC: 1 MG/DL (ref 0.57–1)
D-LACTATE SERPL-SCNC: 1 MMOL/L (ref 0.5–2)
DEPRECATED RDW RBC AUTO: 40 FL (ref 37–54)
EGFRCR SERPLBLD CKD-EPI 2021: 66.3 ML/MIN/1.73
EOSINOPHIL # BLD AUTO: 0.16 10*3/MM3 (ref 0–0.4)
EOSINOPHIL NFR BLD AUTO: 1.9 % (ref 0.3–6.2)
ERYTHROCYTE [DISTWIDTH] IN BLOOD BY AUTOMATED COUNT: 12.6 % (ref 12.3–15.4)
GLOBULIN UR ELPH-MCNC: 2.9 GM/DL
GLUCOSE SERPL-MCNC: 104 MG/DL (ref 65–99)
GLUCOSE UR STRIP-MCNC: NEGATIVE MG/DL
HCT VFR BLD AUTO: 40.3 % (ref 34–46.6)
HGB BLD-MCNC: 13.1 G/DL (ref 12–15.9)
HGB UR QL STRIP.AUTO: NEGATIVE
HOLD SPECIMEN: NORMAL
IMM GRANULOCYTES # BLD AUTO: 0.04 10*3/MM3 (ref 0–0.05)
IMM GRANULOCYTES NFR BLD AUTO: 0.5 % (ref 0–0.5)
KETONES UR QL STRIP: NEGATIVE
LEUKOCYTE ESTERASE UR QL STRIP.AUTO: NEGATIVE
LIPASE SERPL-CCNC: 76 U/L (ref 13–60)
LYMPHOCYTES # BLD AUTO: 3.07 10*3/MM3 (ref 0.7–3.1)
LYMPHOCYTES NFR BLD AUTO: 35.7 % (ref 19.6–45.3)
MCH RBC QN AUTO: 28.1 PG (ref 26.6–33)
MCHC RBC AUTO-ENTMCNC: 32.5 G/DL (ref 31.5–35.7)
MCV RBC AUTO: 86.3 FL (ref 79–97)
MONOCYTES # BLD AUTO: 0.56 10*3/MM3 (ref 0.1–0.9)
MONOCYTES NFR BLD AUTO: 6.5 % (ref 5–12)
NEUTROPHILS NFR BLD AUTO: 4.74 10*3/MM3 (ref 1.7–7)
NEUTROPHILS NFR BLD AUTO: 55.1 % (ref 42.7–76)
NITRITE UR QL STRIP: NEGATIVE
PH UR STRIP.AUTO: 6.5 [PH] (ref 5–8)
PLATELET # BLD AUTO: 322 10*3/MM3 (ref 140–450)
PMV BLD AUTO: 9.9 FL (ref 6–12)
POTASSIUM SERPL-SCNC: 3.8 MMOL/L (ref 3.5–5.2)
PROT SERPL-MCNC: 7.2 G/DL (ref 6–8.5)
PROT UR QL STRIP: NEGATIVE
RBC # BLD AUTO: 4.67 10*6/MM3 (ref 3.77–5.28)
SODIUM SERPL-SCNC: 140 MMOL/L (ref 136–145)
SP GR UR STRIP: 1.01 (ref 1–1.03)
UROBILINOGEN UR QL STRIP: NORMAL
WBC NRBC COR # BLD AUTO: 8.6 10*3/MM3 (ref 3.4–10.8)
WHOLE BLOOD HOLD COAG: NORMAL
WHOLE BLOOD HOLD SPECIMEN: NORMAL

## 2025-01-28 PROCEDURE — 85025 COMPLETE CBC W/AUTO DIFF WBC: CPT | Performed by: EMERGENCY MEDICINE

## 2025-01-28 PROCEDURE — 81003 URINALYSIS AUTO W/O SCOPE: CPT | Performed by: EMERGENCY MEDICINE

## 2025-01-28 PROCEDURE — 83605 ASSAY OF LACTIC ACID: CPT | Performed by: EMERGENCY MEDICINE

## 2025-01-28 PROCEDURE — 25010000002 DICYCLOMINE PER 20 MG: Performed by: PHYSICIAN ASSISTANT

## 2025-01-28 PROCEDURE — 25010000002 METOCLOPRAMIDE PER 10 MG: Performed by: PHYSICIAN ASSISTANT

## 2025-01-28 PROCEDURE — 25010000002 KETOROLAC TROMETHAMINE PER 15 MG: Performed by: PHYSICIAN ASSISTANT

## 2025-01-28 PROCEDURE — 99284 EMERGENCY DEPT VISIT MOD MDM: CPT

## 2025-01-28 PROCEDURE — 74176 CT ABD & PELVIS W/O CONTRAST: CPT

## 2025-01-28 PROCEDURE — 96374 THER/PROPH/DIAG INJ IV PUSH: CPT

## 2025-01-28 PROCEDURE — 25010000002 ONDANSETRON PER 1 MG: Performed by: PHYSICIAN ASSISTANT

## 2025-01-28 PROCEDURE — 96372 THER/PROPH/DIAG INJ SC/IM: CPT

## 2025-01-28 PROCEDURE — 83690 ASSAY OF LIPASE: CPT | Performed by: EMERGENCY MEDICINE

## 2025-01-28 PROCEDURE — 96375 TX/PRO/DX INJ NEW DRUG ADDON: CPT

## 2025-01-28 PROCEDURE — 80053 COMPREHEN METABOLIC PANEL: CPT | Performed by: EMERGENCY MEDICINE

## 2025-01-28 RX ORDER — KETOROLAC TROMETHAMINE 30 MG/ML
30 INJECTION, SOLUTION INTRAMUSCULAR; INTRAVENOUS ONCE
Status: COMPLETED | OUTPATIENT
Start: 2025-01-28 | End: 2025-01-28

## 2025-01-28 RX ORDER — METOCLOPRAMIDE HYDROCHLORIDE 5 MG/ML
10 INJECTION INTRAMUSCULAR; INTRAVENOUS ONCE
Status: COMPLETED | OUTPATIENT
Start: 2025-01-28 | End: 2025-01-28

## 2025-01-28 RX ORDER — DICYCLOMINE HYDROCHLORIDE 10 MG/ML
20 INJECTION INTRAMUSCULAR ONCE
Status: COMPLETED | OUTPATIENT
Start: 2025-01-28 | End: 2025-01-28

## 2025-01-28 RX ORDER — DICYCLOMINE HCL 20 MG
20 TABLET ORAL EVERY 6 HOURS PRN
Qty: 16 TABLET | Refills: 0 | Status: SHIPPED | OUTPATIENT
Start: 2025-01-28 | End: 2025-01-28

## 2025-01-28 RX ORDER — SODIUM CHLORIDE 9 MG/ML
10 INJECTION, SOLUTION INTRAMUSCULAR; INTRAVENOUS; SUBCUTANEOUS AS NEEDED
Status: DISCONTINUED | OUTPATIENT
Start: 2025-01-28 | End: 2025-01-28 | Stop reason: HOSPADM

## 2025-01-28 RX ORDER — DICYCLOMINE HCL 20 MG
20 TABLET ORAL EVERY 6 HOURS PRN
Qty: 16 TABLET | Refills: 0 | Status: SHIPPED | OUTPATIENT
Start: 2025-01-28

## 2025-01-28 RX ORDER — ONDANSETRON 2 MG/ML
4 INJECTION INTRAMUSCULAR; INTRAVENOUS ONCE
Status: COMPLETED | OUTPATIENT
Start: 2025-01-28 | End: 2025-01-28

## 2025-01-28 RX ADMIN — ONDANSETRON 4 MG: 2 INJECTION INTRAMUSCULAR; INTRAVENOUS at 18:08

## 2025-01-28 RX ADMIN — DICYCLOMINE HYDROCHLORIDE 20 MG: 10 INJECTION, SOLUTION INTRAMUSCULAR at 19:41

## 2025-01-28 RX ADMIN — METOCLOPRAMIDE 10 MG: 5 INJECTION, SOLUTION INTRAMUSCULAR; INTRAVENOUS at 19:38

## 2025-01-28 RX ADMIN — KETOROLAC TROMETHAMINE 30 MG: 30 INJECTION, SOLUTION INTRAMUSCULAR; INTRAVENOUS at 18:08

## 2025-01-29 NOTE — DISCHARGE INSTRUCTIONS
Keep your appointment with gastroenterology on the 17th of this month.  Take your home medications as directed and return to the emergency department should you experience any new or worsening symptoms.

## 2025-01-29 NOTE — FSED PROVIDER NOTE
Subjective  History of Present Illness:    Patient is a pleasant 54-year-old female who presents to the emergency department with abdominal pain.  She was seen at urgent care and was sent here for further evaluation.  She has had approximately 1 week of progressively worsening left upper quadrant pain.  She states she has had dysuria, frequency, urgency.  She has taken no medication for her symptoms and states that she has had no improvement.  She initially attributed her symptoms to having the flu last week.  After finishing Tamiflu and having no more coughing her symptoms have persisted.  States that she has had nausea but she denies any vomiting, chest pain, shortness of air, fall, injury, trauma.      Nurses Notes reviewed and agree, including vitals, allergies, social history and prior medical history.     REVIEW OF SYSTEMS:   Review of Systems   Gastrointestinal:  Positive for abdominal pain, diarrhea and nausea.   Genitourinary:  Positive for flank pain and frequency.   All other systems reviewed and are negative.      Past Medical History:   Diagnosis Date    Arrhythmia     Asthma     Coronary artery disease 1998    Tachycardia, Irregular beats    Essential hypertension 04/25/2017    History of abnormal cervical Papanicolaou smear     History of abnormal uterine bleeding     History of bone density study 07/30/2019    totals normal    History of tachycardia     Menopausal symptoms     Ovarian fibroma     Uterine fibroid        Allergies:    Acetaminophen, Amoxicillin, Amlodipine, Codeine, and Prednisone      Past Surgical History:   Procedure Laterality Date    BREAST BIOPSY Bilateral     AGE 13 RARE FORM OF MASTITIS    BREAST SURGERY  age 13    from rare form of mastitis    DILATATION AND CURETTAGE  12/27/2017    ENDOMETRIAL ABLATION  12/27/2017    for abnormal uterine bleeding    ENDOMETRIAL BIOPSY  11/01/2019    for endometrial cells on pap; inconclusive    GYNECOLOGIC CRYOSURGERY  age 15    LAPAROSCOPIC  "TUBAL LIGATION           Social History     Socioeconomic History    Marital status:    Tobacco Use    Smoking status: Never    Smokeless tobacco: Never   Vaping Use    Vaping status: Never Used   Substance and Sexual Activity    Alcohol use: Yes     Comment: occasional    Drug use: Never    Sexual activity: Yes     Partners: Male     Birth control/protection: Surgical         Family History   Problem Relation Age of Onset    Arrhythmia Mother     Atrial fibrillation Mother     Hypertension Mother     Thyroid disease Mother     Fainting Mother     Heart disease Mother     No Known Problems Father     Arrhythmia Maternal Grandmother     Atrial fibrillation Maternal Grandmother     Hypertension Maternal Grandmother     Stroke Maternal Grandmother     Thyroid disease Maternal Grandmother     Heart disease Maternal Grandmother     Ovarian cancer Neg Hx     Breast cancer Neg Hx     Colon cancer Neg Hx     Uterine cancer Neg Hx        Objective  Physical Exam:  /93   Pulse 86   Temp 98.5 °F (36.9 °C) (Oral)   Resp 16   Ht 175.3 cm (69\")   Wt 81.3 kg (179 lb 3.2 oz)   SpO2 100%   BMI 26.46 kg/m²      Physical Exam  Vitals and nursing note reviewed.   Constitutional:       General: She is not in acute distress.     Appearance: Normal appearance. She is normal weight. She is not ill-appearing, toxic-appearing or diaphoretic.   HENT:      Head: Normocephalic and atraumatic.      Right Ear: External ear normal.      Left Ear: External ear normal.      Nose: Nose normal.      Mouth/Throat:      Mouth: Mucous membranes are moist.   Eyes:      General:         Right eye: No discharge.         Left eye: No discharge.      Extraocular Movements: Extraocular movements intact.   Cardiovascular:      Rate and Rhythm: Normal rate.      Pulses: Normal pulses.      Heart sounds: Normal heart sounds.   Pulmonary:      Effort: Pulmonary effort is normal.      Breath sounds: Normal breath sounds.   Abdominal:      " General: Abdomen is flat. Bowel sounds are normal. There is no distension.      Palpations: Abdomen is soft.      Tenderness: There is abdominal tenderness in the left upper quadrant and left lower quadrant. There is left CVA tenderness and guarding. There is no rebound.       Musculoskeletal:         General: Normal range of motion.      Cervical back: Normal range of motion and neck supple.   Skin:     General: Skin is warm and dry.      Capillary Refill: Capillary refill takes less than 2 seconds.      Findings: No erythema or rash.   Neurological:      General: No focal deficit present.      Mental Status: She is alert and oriented to person, place, and time.      Gait: Gait normal.   Psychiatric:         Mood and Affect: Mood normal.         Behavior: Behavior normal.       Procedures    ED Course:         Lab Results (last 24 hours)       Procedure Component Value Units Date/Time    CBC & Differential [[id number]]  (Normal) Collected: 01/28/25 1801    Specimen: Blood Updated: 01/28/25 1807    Narrative:      The following orders were created for panel order CBC & Differential.  Procedure                               Abnormality         Status                     ---------                               -----------         ------                     CBC Auto Differential[[id number]]        Normal              Final result                 Please view results for these tests on the individual orders.    Comprehensive Metabolic Panel [[id number]]  (Abnormal) Collected: 01/28/25 1801    Specimen: Blood Updated: 01/28/25 1832     Glucose 104 mg/dL      BUN 12 mg/dL      Creatinine 1.00 mg/dL      Sodium 140 mmol/L      Potassium 3.8 mmol/L      Comment: Specimen hemolyzed.  Result may be falsely elevated.        Chloride 103 mmol/L      CO2 23.1 mmol/L      Calcium 10.0 mg/dL      Total Protein 7.2 g/dL      Albumin 4.3 g/dL      ALT (SGPT) 16 U/L      AST (SGOT) 23 U/L      Alkaline Phosphatase 89 U/L      Total  Bilirubin 0.2 mg/dL      Globulin 2.9 gm/dL      A/G Ratio 1.5 g/dL      BUN/Creatinine Ratio 12.0     Anion Gap 13.9 mmol/L      eGFR 66.3 mL/min/1.73     Narrative:      GFR Categories in Chronic Kidney Disease (CKD)      GFR Category          GFR (mL/min/1.73)    Interpretation  G1                     90 or greater         Normal or high (1)  G2                      60-89                Mild decrease (1)  G3a                   45-59                Mild to moderate decrease  G3b                   30-44                Moderate to severe decrease  G4                    15-29                Severe decrease  G5                    14 or less           Kidney failure          (1)In the absence of evidence of kidney disease, neither GFR category G1 or G2 fulfill the criteria for CKD.    eGFR calculation 2021 CKD-EPI creatinine equation, which does not include race as a factor    Lipase [[id number]]  (Abnormal) Collected: 01/28/25 1801    Specimen: Blood Updated: 01/28/25 1827     Lipase 76 U/L     Lactic Acid, Plasma [[id number]]  (Normal) Collected: 01/28/25 1801    Specimen: Blood Updated: 01/28/25 1826     Lactate 1.0 mmol/L     CBC Auto Differential [[id number]]  (Normal) Collected: 01/28/25 1801    Specimen: Blood Updated: 01/28/25 1807     WBC 8.60 10*3/mm3      RBC 4.67 10*6/mm3      Hemoglobin 13.1 g/dL      Hematocrit 40.3 %      MCV 86.3 fL      MCH 28.1 pg      MCHC 32.5 g/dL      RDW 12.6 %      RDW-SD 40.0 fl      MPV 9.9 fL      Platelets 322 10*3/mm3      Neutrophil % 55.1 %      Lymphocyte % 35.7 %      Monocyte % 6.5 %      Eosinophil % 1.9 %      Basophil % 0.3 %      Immature Grans % 0.5 %      Neutrophils, Absolute 4.74 10*3/mm3      Lymphocytes, Absolute 3.07 10*3/mm3      Monocytes, Absolute 0.56 10*3/mm3      Eosinophils, Absolute 0.16 10*3/mm3      Basophils, Absolute 0.03 10*3/mm3      Immature Grans, Absolute 0.04 10*3/mm3     Urinalysis With Microscopic If Indicated (No Culture) - Urine, Clean  Catch [[id number]]  (Normal) Collected: 01/28/25 1853    Specimen: Urine, Clean Catch Updated: 01/28/25 1859     Color, UA Yellow     Appearance, UA Clear     pH, UA 6.5     Specific Gravity, UA 1.010     Glucose, UA Negative     Ketones, UA Negative     Bilirubin, UA Negative     Blood, UA Negative     Protein, UA Negative     Leuk Esterase, UA Negative     Nitrite, UA Negative     Urobilinogen, UA 0.2 E.U./dL    Narrative:      Urine microscopic not indicated.             CT Abdomen Pelvis Stone Protocol    Result Date: 1/28/2025  CT ABDOMEN PELVIS STONE PROTOCOL Date of Exam: 1/28/2025 6:25 PM EST Indication: left flank pain, urinary frequency. Comparison: CT abdomen pelvis 1/28/2019 Technique: Axial CT images were obtained of the abdomen and pelvis without the administration of contrast. Reconstructed coronal and sagittal images were also obtained. Automated exposure control and iterative construction methods were used. Findings: Included Chest: Bibasal atelectasis Liver: No significant abnormality.  Gallbladder and biliary tree: No significant abnormality.  Spleen: No significant abnormality.  Pancreas: No significant abnormality.  Adrenal glands: No significant abnormality.  Kidneys and ureters: No significant abnormality. No hydroureteronephrosis. No radiopaque calculi seen.  Stomach and duodenum:No significant abnormality. Small and large bowel: A few colonic diverticuli. No evidence of bowel obstruction. Normal-appearing appendix. Peritoneal cavity: No free air or free fluid. Mild mesenteric panniculitis, as similar to prior. Bladder: No significant abnormality.  Pelvic organs: No significant abnormality.  Vasculature: Atherosclerotic calcifications.  Lymph nodes: No pathologic appearing lymph nodes by imaging criteria.  Bones and soft tissues: Degenerative changes of the imaged spine. No acute osseous abnormality. Small fat-containing umbilical hernia.     Impression: Impression: No acute findings in the  abdomen or pelvis. Electronically Signed: Dewayne Holley  1/28/2025 7:11 PM EST  Workstation ID: [id number]        MDM      Initial impression of presenting illness: Abdominal pain, flank pain    DDX: includes but is not limited to: Nephrolithiasis, UTI, pyelonephritis, constipation, abdominal pain unspecified, diverticulitis, diverticulosis    Patient arrives to the emergency department ambulatory in no acute distress vehicle with vitals interpreted by myself.     Pertinent features from physical exam: Tenderness to the left side of the abdomen and left CVA tenderness.    Initial diagnostic plan: CT scan, CBC, CMP, lipase, urinalysis    Results from initial plan were reviewed and interpreted by me revealing normal CBC and nonactionable CMP with mild elevation of lipase.  CT scan with moderate stool throughout colon and distended stomach with food contents within it.  Pain significantly improved after Toradol    Diagnostic information from other sources: Not applicable    Interventions / Re-evaluation: Patient's pain is improved and there were no acute findings indicating need for admission or consultation to specialist tonight.  She does have an outpatient GI appointment on the 17th with Dr. Aguilar.  Patient is instructed to keep this appointment and return for any new or worsening symptoms.  Will give her a dose of Bentyl and Reglan prior to discharge from the emergency department    Medications   Sodium Chloride (PF) 0.9 % 10 mL (has no administration in time range)   ketorolac (TORADOL) injection 30 mg (30 mg Intravenous Given 1/28/25 1808)   ondansetron (ZOFRAN) injection 4 mg (4 mg Intravenous Given 1/28/25 1808)   metoclopramide (REGLAN) injection 10 mg (10 mg Intravenous Given 1/28/25 1938)   dicyclomine (BENTYL) injection 20 mg (20 mg Intramuscular Given 1/28/25 1941)       Results/clinical rationale were discussed with patient    Consultations/Discussion of results with other physicians: Discussed patient's  case and care with Dr. Nash Who Reviewed the CT Scan in the Emergency Department Tonight.  He Agrees with Plan to Administer Reglan and Bentyl and Discharge.    Data interpreted: Nursing notes reviewed, vital signs reviewed CBC with differential, CMP, and Urine analysis and gram, sensitivities and cultures CT of abdomen/pelvis results reviewed independently interpreted by me.  O2 saturation: 100% on room air    Care significantly affected by Social Determinants of Health (housing and economic circumstances, unemployment)               [] Yes     [x] No              If yes, Patient's care significantly limited by  Social Determinants of Health including:              [] Inadequate housing              [] Low income              [] Alcoholism and drug addiction in family              [] Problems related to primary support group              [] Unemployment              [] Problems related to employment              [] Other Social Determinants of Health:     Counseling: Discussed the results above with the patient regarding need for discharged home. Return precautions were given to patient.  Patient understands and agrees plan of care.      -----  ED Disposition       ED Disposition   Discharge    Condition   Stable    Comment   Keep your appointment with gastroenterology on the 17th of this month.  Take your home medications as directed and return to the emergency department should you experience any new or worsening symptoms.             Final diagnoses:   Abdominal pain, unspecified abdominal location      Your Follow-Up Providers       Awilda Frances MD. Call in 3 days.    Specialty: Internal Medicine  Follow up details: In the next 3 days to make an appointment for follow-up.  [address] [address] [phone]               Go to  [facility] EMERGENCY DEPARTMENT [place].    Specialty: Emergency Medicine  Follow up details: As needed, If symptoms worsen  [address] [facility]  [facility] [address]  [facility] [address] [phone]                     Contact information for after-discharge care    Follow-up information has not been specified.                    Your medication list        START taking these medications        Instructions Last Dose Given Next Dose Due   dicyclomine 20 MG tablet  Commonly known as: BENTYL      Take 1 tablet by mouth Every 6 (Six) Hours As Needed for Abdominal Cramping.              CONTINUE taking these medications        Instructions Last Dose Given Next Dose Due   bisoprolol 5 MG tablet  Commonly known as: ZEBeta      TAKE 1 TABLET BY MOUTH DAILY       cloNIDine 0.1 MG tablet  Commonly known as: CATAPRES      TAKE 1 TABLET EVERY 12 HOURS AS NEEDED FOR HIGH BLOOD PRESSURE>160/90       furosemide 20 MG tablet  Commonly known as: LASIX      Take 1 tablet by mouth As Needed (daily as needed for edema).       modafinil 100 MG tablet  Commonly known as: PROVIGIL      TAKE ONE TABLET BY MOUTH DAILY AS NEEDED (SOMNOLENCE)       Mucinex 600 MG 12 hr tablet  Generic drug: guaiFENesin      Take 1 tablet by mouth As Needed.       ondansetron 4 MG tablet  Commonly known as: ZOFRAN      Take 1 tablet by mouth As Needed.       potassium chloride ER 20 MEQ tablet controlled-release ER tablet  Commonly known as: K-TAB      Take 1 tablet by mouth As Needed (take with lasix).       ProAir  (90 Base) MCG/ACT inhaler  Generic drug: albuterol sulfate HFA      Inhale 2 puffs Every 4 (Four) Hours As Needed for Wheezing.                 Where to Get Your Medications        These medications were sent to [id number] - [place], [place] - [address] AT [facility] [address] - [phone] [facility] [phone]   [address], [facility] [address]      Phone: [phone]   dicyclomine 20 MG tablet

## 2025-04-09 RX ORDER — CLONIDINE HYDROCHLORIDE 0.1 MG/1
0.1 TABLET ORAL EVERY 12 HOURS PRN
Qty: 15 TABLET | Refills: 2 | Status: SHIPPED | OUTPATIENT
Start: 2025-04-09

## 2025-04-09 RX ORDER — BISOPROLOL FUMARATE 5 MG/1
5 TABLET, FILM COATED ORAL DAILY
Qty: 30 TABLET | Refills: 2 | Status: SHIPPED | OUTPATIENT
Start: 2025-04-09

## 2025-04-22 DIAGNOSIS — R40.0 SOMNOLENCE: ICD-10-CM

## 2025-04-22 RX ORDER — MODAFINIL 100 MG/1
100 TABLET ORAL AS NEEDED
Qty: 30 TABLET | Refills: 5 | Status: SHIPPED | OUTPATIENT
Start: 2025-04-22 | End: 2025-04-22 | Stop reason: SDUPTHER

## 2025-04-22 RX ORDER — MODAFINIL 100 MG/1
100 TABLET ORAL AS NEEDED
Qty: 30 TABLET | Refills: 5 | Status: SHIPPED | OUTPATIENT
Start: 2025-04-22

## 2025-05-27 ENCOUNTER — OFFICE VISIT (OUTPATIENT)
Dept: CARDIOLOGY | Facility: CLINIC | Age: 55
End: 2025-05-27
Payer: COMMERCIAL

## 2025-05-27 VITALS
SYSTOLIC BLOOD PRESSURE: 136 MMHG | HEART RATE: 72 BPM | HEIGHT: 69 IN | WEIGHT: 179 LBS | DIASTOLIC BLOOD PRESSURE: 80 MMHG | OXYGEN SATURATION: 98 % | BODY MASS INDEX: 26.51 KG/M2

## 2025-05-27 DIAGNOSIS — R00.2 PALPITATIONS: Primary | ICD-10-CM

## 2025-05-27 PROCEDURE — 99214 OFFICE O/P EST MOD 30 MIN: CPT | Performed by: INTERNAL MEDICINE

## 2025-05-27 RX ORDER — FUROSEMIDE 20 MG/1
20 TABLET ORAL AS NEEDED
Qty: 30 TABLET | Refills: 5 | Status: SHIPPED | OUTPATIENT
Start: 2025-05-27

## 2025-05-27 RX ORDER — ALBUTEROL SULFATE 90 UG/1
2 AEROSOL, METERED RESPIRATORY (INHALATION) EVERY 4 HOURS PRN
Qty: 8.5 G | Refills: 1 | Status: SHIPPED | OUTPATIENT
Start: 2025-05-27

## 2025-05-27 RX ORDER — BISOPROLOL FUMARATE 5 MG/1
5 TABLET, FILM COATED ORAL DAILY
Qty: 90 TABLET | Refills: 3 | Status: SHIPPED | OUTPATIENT
Start: 2025-05-27

## 2025-05-27 RX ORDER — IBUPROFEN 600 MG/1
TABLET, FILM COATED ORAL AS NEEDED
COMMUNITY
Start: 2025-02-18

## 2025-05-27 RX ORDER — CLONIDINE HYDROCHLORIDE 0.1 MG/1
0.1 TABLET ORAL EVERY 12 HOURS PRN
Qty: 15 TABLET | Refills: 2 | Status: SHIPPED | OUTPATIENT
Start: 2025-05-27

## 2025-05-27 NOTE — PROGRESS NOTES
[facility] at [facility]  Office visit  Ximena Sanchez  1970  [phone]    VISIT DATE:  5/27/2025      PCP: Awilda Frances MD  [address] [address]    CC:  Chief Complaint   Patient presents with    Palpitations     6 month follow up       PROBLEM LIST:  1.  Palpitations                        A.  Stress test in [place] approx 7 years ago.  Irregular rhythm at 145 bpm; good recovery.                         B.  Holter monitor in [place]:  Multifocal palpitations; data deficit  2.  Cardiac murmur                        A.  Echocardiogram 2014: EF 56%, no significant valvular abnormalities.    3.  Hypertension  4.  Osteoarthritis in foot and ankle  5.  Uterine fibroids  6.  Surgeries                        A.  Bilateral lumpectomy- benign    Previous cardiac studies and procedures:  February 2020 echo  Left ventricular systolic function is normal.  Estimated EF appears to be in the range of 61 - 65%    September 2021 coronary CTA  1. Calcium score datasets detected 0 distinct calcified plaque lesions  placing patient in the no identifiable   calcification category with a calcium score of 0.0.  2. Structurally normal appearing cardiac anatomy without evidence for  valvular calcification. Cardiac size within normal limits without  pericardial effusion or disease.   3. Normal left atrial anatomy with normal left atrial appendage. No  central pulmonary filling defect or thrombus identified.  4. Normal appearing coronary anatomy without evidence for coronary  stenosis, plaque or origin anomaly.  5. No significant abnormalities of the adjacent mediastinum or lungs and  widened lung windows.    January 2023 right lower extremity venous duplex   Normal right lower extremity venous duplex scan.    ASSESSMENT:   Diagnosis Plan   1. Palpitations                PLAN:  Palpitations: Continue bisoprolol, did not tolerate lower doses, does not tolerate generic equivalent.  Continue regular  "exercise which really appears to help moderate symptoms.      Syncope: Episode of nocturnal orthostatic hypotension resulting in transient syncope in May.  No recurrence.  Encouraged avoiding dehydration.  Discussed abortive maneuvers..    Daytime fatigue and somnolence: No clinical concern for sleep disorder at this time. Potentially consistent with underlying mild narcolepsy.  Has responded well to as needed modafinil 100 mg p.o. daily as needed.    Chest pain, noncardiac: No recurrence.  Has undergone a low risk evaluation with normal coronary CTA which was also remarkable for 0 coronary calcification.    Spider veins: More prominent in the right foot and right ankle.  Previously discussed pathophysiology.  Currently asymptomatic.    Raynaud's variant: Previously discussed avoiding colder temperatures.  Calcium channel blockade effective however induced constipation.  Will consider alternative pharmacologic agents this winter if symptoms return such as sildenafil.    Dependent edema: Discussed limiting sodium intake.  Elevating feet when possible.  Continued exercise.  Use of compression stockings.  Prescription given for as needed Lasix with potassium supplementation.    Subjective  Blood pressures are predominantly running less than 130/80 mmHg.   Palpitations are well-controlled.  Exercising on a regular basis.  Ongoing evaluation for gastroparesis.  Unremarkable recent EGD and colonoscopy.    PHYSICAL EXAMINATION:  Vitals:    05/27/25 1510   BP: 136/80   BP Location: Right arm   Patient Position: Sitting   Cuff Size: Adult   Pulse: 72   SpO2: 98%   Weight: 81.2 kg (179 lb)   Height: 175.3 cm (69\")               General Appearance:    Alert, cooperative, no distress, appears stated age   Head:    Normocephalic, without obvious abnormality, atraumatic   Eyes:    conjunctiva/corneas clear   Nose:   Nares normal, septum midline, mucosa normal, no drainage   Throat:   Lips, teeth and gums normal   Neck:   Supple, " symmetrical, trachea midline, no carotid    bruit or JVD   Lungs:     Clear to auscultation bilaterally, respirations unlabored   Chest Wall:    No tenderness or deformity    Heart:    Regular rate and rhythm, S1 and S2 normal, no murmur, rub   or gallop, normal carotid impulse bilaterally without bruit.   Abdomen:     Soft, non-tender   Extremities:   Extremities normal, atraumatic, no cyanosis.  Trivial 1+ bilateral ankle edema   Pulses:   2+ and symmetric all extremities   Skin:   Skin color, texture, turgor normal, no rashes or lesions       Diagnostic Data:  Procedures  Lab Results   Component Value Date    CHLPL 241 (H) 11/30/2023    TRIG 124 11/30/2023    HDL 66 11/30/2023     Lab Results   Component Value Date    GLUCOSE 104 (H) 01/28/2025    BUN 12 01/28/2025    CREATININE 1.00 01/28/2025     01/28/2025    K 3.8 01/28/2025     01/28/2025    CO2 23.1 01/28/2025     Lab Results   Component Value Date    HGBA1C 5.8 (H) 11/30/2023     Lab Results   Component Value Date    WBC 8.60 01/28/2025    HGB 13.1 01/28/2025    HCT 40.3 01/28/2025     01/28/2025       Allergies  Allergies   Allergen Reactions    Other Anaphylaxis and Other (See Comments)     Paraleptic gases for general anesthesia.  DO NOT ADMINISTER    Acetaminophen Other (See Comments)     Flu like symptoms      Amoxicillin Hives    Amlodipine Other (See Comments)     constipation    Codeine GI Intolerance    Prednisone Other (See Comments)     Low doses tolerated; tremors with higher doses       Current Medications    Current Outpatient Medications:     bisoprolol (ZEBeta) 5 MG tablet, Take 1 tablet by mouth Daily., Disp: 30 tablet, Rfl: 2    cloNIDine (CATAPRES) 0.1 MG tablet, Take 1 tablet by mouth Every 12 (Twelve) Hours As Needed for High Blood Pressure (gretaer than 160/90)., Disp: 15 tablet, Rfl: 2    dicyclomine (BENTYL) 20 MG tablet, Take 1 tablet by mouth Every 6 (Six) Hours As Needed for Abdominal Cramping., Disp: 16  tablet, Rfl: 0    esomeprazole (nexIUM) 20 MG capsule, Take 1 capsule by mouth Daily., Disp: , Rfl:     furosemide (LASIX) 20 MG tablet, Take 1 tablet by mouth As Needed (daily as needed for edema)., Disp: 30 tablet, Rfl: 5    ibuprofen (ADVIL,MOTRIN) 600 MG tablet, As Needed., Disp: , Rfl:     modafinil (PROVIGIL) 100 MG tablet, Take 1 tablet by mouth As Needed (SOMNOLENCE). (Patient taking differently: Take 1 tablet by mouth Daily As Needed (SOMNOLENCE).), Disp: 30 tablet, Rfl: 5    Mucinex 600 MG 12 hr tablet, Take 1 tablet by mouth As Needed., Disp: , Rfl:     ondansetron (ZOFRAN) 4 MG tablet, Take 1 tablet by mouth As Needed., Disp: , Rfl:     potassium chloride ER (K-TAB) 20 MEQ tablet controlled-release ER tablet, Take 1 tablet by mouth As Needed (take with lasix)., Disp: 30 tablet, Rfl: 5    ProAir  (90 Base) MCG/ACT inhaler, Inhale 2 puffs Every 4 (Four) Hours As Needed for Wheezing., Disp: 8.5 g, Rfl: 1          ROS  Review of Systems   Cardiovascular:  Positive for palpitations. Negative for chest pain and dyspnea on exertion.   Respiratory:  Positive for cough and wheezing. Negative for shortness of breath.          SOCIAL HX  Social History     Socioeconomic History    Marital status:    Tobacco Use    Smoking status: Never    Smokeless tobacco: Never   Vaping Use    Vaping status: Never Used   Substance and Sexual Activity    Alcohol use: Yes     Comment: occasional    Drug use: Never    Sexual activity: Yes     Partners: Male     Birth control/protection: Surgical       FAMILY HX  Family History   Problem Relation Age of Onset    Arrhythmia Mother     Atrial fibrillation Mother     Hypertension Mother     Thyroid disease Mother     Fainting Mother     Heart disease Mother     No Known Problems Father     Arrhythmia Maternal Grandmother     Atrial fibrillation Maternal Grandmother     Hypertension Maternal Grandmother     Stroke Maternal Grandmother     Thyroid disease Maternal  Grandmother     Heart disease Maternal Grandmother     Ovarian cancer Neg Hx     Breast cancer Neg Hx     Colon cancer Neg Hx     Uterine cancer Neg Hx              Andre Smyth III, MD, FACC